# Patient Record
Sex: MALE | Race: OTHER | HISPANIC OR LATINO | Employment: UNEMPLOYED | URBAN - METROPOLITAN AREA
[De-identification: names, ages, dates, MRNs, and addresses within clinical notes are randomized per-mention and may not be internally consistent; named-entity substitution may affect disease eponyms.]

---

## 2021-02-24 ENCOUNTER — HOSPITAL ENCOUNTER (EMERGENCY)
Facility: HOSPITAL | Age: 46
Discharge: HOME | End: 2021-02-24
Attending: EMERGENCY MEDICINE | Admitting: EMERGENCY MEDICINE
Payer: COMMERCIAL

## 2021-02-24 VITALS
SYSTOLIC BLOOD PRESSURE: 140 MMHG | RESPIRATION RATE: 16 BRPM | WEIGHT: 200.4 LBS | HEART RATE: 86 BPM | DIASTOLIC BLOOD PRESSURE: 78 MMHG | OXYGEN SATURATION: 97 % | TEMPERATURE: 98.1 F

## 2021-02-24 DIAGNOSIS — Z20.822 COVID-19 VIRUS RNA TEST RESULT UNKNOWN: Primary | ICD-10-CM

## 2021-02-24 PROCEDURE — 99282 EMERGENCY DEPT VISIT SF MDM: CPT | Performed by: EMERGENCY MEDICINE

## 2021-02-24 PROCEDURE — 87635 SARS-COV-2 COVID-19 AMP PRB: CPT | Performed by: EMERGENCY MEDICINE

## 2021-02-24 PROCEDURE — 99283 EMERGENCY DEPT VISIT LOW MDM: CPT

## 2021-02-25 LAB — SARS-COV-2 RNA RESP QL NAA+PROBE: NEGATIVE

## 2021-02-25 NOTE — ED PROVIDER NOTES
HPI: Patient is a 50 y o  male who presents with 2-3  days of headache and nausea which the patient describes at mild The patient has not had contact with people with similar symptoms  The patient has not taken any medication  Patient is Divehi speaking only    No Known Allergies    History reviewed  No pertinent past medical history  History reviewed  No pertinent surgical history  Social History     Tobacco Use    Smoking status: Not on file   Substance Use Topics    Alcohol use: Not on file    Drug use: Not on file       Nursing notes reviewed  Physical Exam:  ED Triage Vitals [02/24/21 1938]   Temperature Pulse Respirations Blood Pressure SpO2   98 1 °F (36 7 °C) 86 16 140/78 97 %      Temp Source Heart Rate Source Patient Position - Orthostatic VS BP Location FiO2 (%)   Tympanic Monitor Lying Left arm --      Pain Score       --           ROS: Positive for headache, nausea, the remainder of a 10 organ system ROS was otherwise unremarkable  General: awake, alert, no acute distress    Head: normocephalic, atraumatic    Eyes: no scleral icterus  Ears: external ears normal, hearing grossly intact  Nose: external exam grossly normal, negative nasal discharge  Neck: symmetric, No JVD noted, trachea midline  Pulmonary: no respiratory distress, no tachypnea noted  Lungs clear to auscultation bilaterally  Cardiovascular: appears well perfused  Regular rate and rhythm  Abdomen: no distention noted  Bowel sounds x4  Musculoskeletal: no deformities noted, tone normal  Neuro: grossly non-focal   NIH 0  Patient ambulated with a nonataxic gait   Psych: mood and affect appropriate    The patient is stable and has a history and physical exam consistent with a viral illness  COVID19 testing has been performed  I considered the patient's other medical conditions as applicable/noted above in my medical decision making  The patient is stable upon discharge  The plan is for supportive care at home      The patient (and any family present) verbalized understanding of the discharge instructions and warnings that would necessitate return to the Emergency Department  All questions were answered prior to discharge  Medications - No data to display  Final diagnoses:   COVID-19 virus RNA test result unknown     Time reflects when diagnosis was documented in both MDM as applicable and the Disposition within this note     Time User Action Codes Description Comment    2/24/2021  7:36 PM Viet Jaimes [Z20 [id number]] COVID-19 virus RNA test result unknown       ED Disposition     ED Disposition Condition Date/Time Comment    Discharge Stable Wed Feb 24, 2021  7:36 PM Anette Zarate discharge to home/self care  Follow-up Information     Follow up With Specialties Details Why Contact Info Additional [address] [facility] Medicine Schedule an appointment as soon as possible for a visit in 2 days  [address], [address] [facility] [id number]  [address], [address], [address], [address] [place] [address]        Patient's Medications    No medications on file     No discharge procedures on file      Electronically Signed by         Sasha Storey DO  02/24/21 1940

## 2021-02-25 NOTE — DISCHARGE INSTRUCTIONS

## 2021-02-28 ENCOUNTER — TELEPHONE (OUTPATIENT)
Dept: EMERGENCY DEPT | Facility: HOSPITAL | Age: 46
End: 2021-02-28

## 2021-03-02 ENCOUNTER — TELEPHONE (OUTPATIENT)
Dept: EMERGENCY DEPT | Facility: HOSPITAL | Age: 46
End: 2021-03-02

## 2021-03-24 ENCOUNTER — HOSPITAL ENCOUNTER (EMERGENCY)
Age: 46
Discharge: HOME | End: 2021-03-24
Attending: EMERGENCY MEDICINE

## 2021-03-24 ENCOUNTER — APPOINTMENT (OUTPATIENT)
Dept: GENERAL RADIOLOGY | Age: 46
End: 2021-03-24

## 2021-03-24 ENCOUNTER — APPOINTMENT (OUTPATIENT)
Dept: CT IMAGING | Age: 46
End: 2021-03-24

## 2021-03-24 VITALS
WEIGHT: 205 LBS | BODY MASS INDEX: 26.31 KG/M2 | RESPIRATION RATE: 16 BRPM | TEMPERATURE: 98.3 F | SYSTOLIC BLOOD PRESSURE: 115 MMHG | DIASTOLIC BLOOD PRESSURE: 77 MMHG | HEIGHT: 74 IN | HEART RATE: 91 BPM | OXYGEN SATURATION: 98 %

## 2021-03-24 DIAGNOSIS — T07.XXXA MULTIPLE CONTUSIONS: Primary | ICD-10-CM

## 2021-03-24 LAB
ABSOLUTE EOS #: 0.1 K/UL (ref 0–0.4)
ABSOLUTE IMMATURE GRANULOCYTE: ABNORMAL K/UL (ref 0–0.3)
ABSOLUTE LYMPH #: 2.3 K/UL (ref 1–4.8)
ABSOLUTE MONO #: 0.7 K/UL (ref 0.1–1.3)
ALBUMIN SERPL-MCNC: 4.1 G/DL (ref 3.5–5.2)
ALBUMIN/GLOBULIN RATIO: ABNORMAL (ref 1–2.5)
ALP BLD-CCNC: 80 U/L (ref 40–129)
ALT SERPL-CCNC: 13 U/L (ref 5–41)
ANION GAP SERPL CALCULATED.3IONS-SCNC: 11 MMOL/L (ref 9–17)
AST SERPL-CCNC: 17 U/L
BASOPHILS # BLD: 1 % (ref 0–2)
BASOPHILS ABSOLUTE: 0.1 K/UL (ref 0–0.2)
BILIRUB SERPL-MCNC: 0.28 MG/DL (ref 0.3–1.2)
BUN BLDV-MCNC: 18 MG/DL (ref 6–20)
BUN/CREAT BLD: ABNORMAL (ref 9–20)
CALCIUM SERPL-MCNC: 9.3 MG/DL (ref 8.6–10.4)
CHLORIDE BLD-SCNC: 103 MMOL/L (ref 98–107)
CO2: 24 MMOL/L (ref 20–31)
CREAT SERPL-MCNC: 0.79 MG/DL (ref 0.7–1.2)
DIFFERENTIAL TYPE: ABNORMAL
EOSINOPHILS RELATIVE PERCENT: 2 % (ref 0–4)
GFR AFRICAN AMERICAN: >60 ML/MIN
GFR NON-AFRICAN AMERICAN: >60 ML/MIN
GFR SERPL CREATININE-BSD FRML MDRD: ABNORMAL ML/MIN/{1.73_M2}
GFR SERPL CREATININE-BSD FRML MDRD: ABNORMAL ML/MIN/{1.73_M2}
GLUCOSE BLD-MCNC: 102 MG/DL (ref 70–99)
HCT VFR BLD CALC: 42.4 % (ref 41–53)
HEMOGLOBIN: 14.2 G/DL (ref 13.5–17.5)
IMMATURE GRANULOCYTES: ABNORMAL %
LYMPHOCYTES # BLD: 26 % (ref 24–44)
MCH RBC QN AUTO: 29.9 PG (ref 26–34)
MCHC RBC AUTO-ENTMCNC: 33.6 G/DL (ref 31–37)
MCV RBC AUTO: 89 FL (ref 80–100)
MONOCYTES # BLD: 8 % (ref 1–7)
NRBC AUTOMATED: ABNORMAL PER 100 WBC
PDW BLD-RTO: 12.8 % (ref 11.5–14.9)
PLATELET # BLD: 256 K/UL (ref 150–450)
PLATELET ESTIMATE: ABNORMAL
PMV BLD AUTO: 6.9 FL (ref 6–12)
POTASSIUM SERPL-SCNC: 3.9 MMOL/L (ref 3.7–5.3)
RBC # BLD: 4.76 M/UL (ref 4.5–5.9)
RBC # BLD: ABNORMAL 10*6/UL
SEG NEUTROPHILS: 63 % (ref 36–66)
SEGMENTED NEUTROPHILS ABSOLUTE COUNT: 5.4 K/UL (ref 1.3–9.1)
SODIUM BLD-SCNC: 138 MMOL/L (ref 135–144)
TOTAL PROTEIN: 6.8 G/DL (ref 6.4–8.3)
WBC # BLD: 8.5 K/UL (ref 3.5–11)
WBC # BLD: ABNORMAL 10*3/UL

## 2021-03-24 PROCEDURE — 85025 COMPLETE CBC W/AUTO DIFF WBC: CPT

## 2021-03-24 PROCEDURE — 73502 X-RAY EXAM HIP UNI 2-3 VIEWS: CPT

## 2021-03-24 PROCEDURE — 73562 X-RAY EXAM OF KNEE 3: CPT

## 2021-03-24 PROCEDURE — 6370000000 HC RX 637 (ALT 250 FOR IP): Performed by: EMERGENCY MEDICINE

## 2021-03-24 PROCEDURE — 99284 EMERGENCY DEPT VISIT MOD MDM: CPT

## 2021-03-24 PROCEDURE — 72125 CT NECK SPINE W/O DYE: CPT

## 2021-03-24 PROCEDURE — 80053 COMPREHEN METABOLIC PANEL: CPT

## 2021-03-24 PROCEDURE — 72131 CT LUMBAR SPINE W/O DYE: CPT

## 2021-03-24 PROCEDURE — 71260 CT THORAX DX C+: CPT

## 2021-03-24 PROCEDURE — 73080 X-RAY EXAM OF ELBOW: CPT

## 2021-03-24 PROCEDURE — 36415 COLL VENOUS BLD VENIPUNCTURE: CPT

## 2021-03-24 PROCEDURE — 70450 CT HEAD/BRAIN W/O DYE: CPT

## 2021-03-24 PROCEDURE — 70486 CT MAXILLOFACIAL W/O DYE: CPT

## 2021-03-24 RX ORDER — 0.9 % SODIUM CHLORIDE 0.9 %
80 INTRAVENOUS SOLUTION INTRAVENOUS ONCE
Status: DISCONTINUED | OUTPATIENT
Start: 2021-03-24 | End: 2021-03-24 | Stop reason: HOSPADM

## 2021-03-24 RX ORDER — SODIUM CHLORIDE 0.9 % (FLUSH) 0.9 %
10 SYRINGE (ML) INJECTION PRN
Status: DISCONTINUED | OUTPATIENT
Start: 2021-03-24 | End: 2021-03-24 | Stop reason: HOSPADM

## 2021-03-24 RX ORDER — CYCLOBENZAPRINE HCL 10 MG
10 TABLET ORAL 3 TIMES DAILY PRN
Qty: 10 TABLET | Refills: 0 | Status: SHIPPED | OUTPATIENT
Start: 2021-03-24 | End: 2021-04-03

## 2021-03-24 RX ORDER — HYDROCODONE BITARTRATE AND ACETAMINOPHEN 5; 325 MG/1; MG/1
1 TABLET ORAL ONCE
Status: COMPLETED | OUTPATIENT
Start: 2021-03-24 | End: 2021-03-24

## 2021-03-24 RX ORDER — HYDROCODONE BITARTRATE AND ACETAMINOPHEN 5; 325 MG/1; MG/1
1 TABLET ORAL EVERY 6 HOURS PRN
Qty: 10 TABLET | Refills: 0 | Status: SHIPPED | OUTPATIENT
Start: 2021-03-24 | End: 2021-03-27

## 2021-03-24 RX ADMIN — HYDROCODONE BITARTRATE AND ACETAMINOPHEN 1 TABLET: 5; 325 TABLET ORAL at 11:20

## 2021-03-24 SDOH — HEALTH STABILITY: MENTAL HEALTH: HOW OFTEN DO YOU HAVE A DRINK CONTAINING ALCOHOL?: NEVER

## 2021-03-24 ASSESSMENT — ENCOUNTER SYMPTOMS
VOMITING: 0
SHORTNESS OF BREATH: 0
EYE PAIN: 0
WHEEZING: 0
RHINORRHEA: 0
TROUBLE SWALLOWING: 0
BLOOD IN STOOL: 0
NAUSEA: 0
CONSTIPATION: 0
COLOR CHANGE: 0
CHEST TIGHTNESS: 0
BACK PAIN: 1
DIARRHEA: 0
EYE REDNESS: 0
SINUS PRESSURE: 0
EYE DISCHARGE: 0
SORE THROAT: 0
ABDOMINAL PAIN: 0
FACIAL SWELLING: 0
COUGH: 0

## 2021-03-24 ASSESSMENT — PAIN SCALES - GENERAL: PAINLEVEL_OUTOF10: 6
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
## 2021-03-24 NOTE — ED NOTES
C-collar applied by this RN while c-spine maintained. PMS x 4 intact after c-collar applied.      Ana Redmond RN  03/24/21 1037

## 2021-12-01 ENCOUNTER — HOSPITAL ENCOUNTER (EMERGENCY)
Age: 46
Discharge: LEFT BEFORE DISCHARGE | End: 2021-12-01

## 2021-12-01 VITALS
TEMPERATURE: 98.4 F | RESPIRATION RATE: 18 BRPM | SYSTOLIC BLOOD PRESSURE: 136 MMHG | DIASTOLIC BLOOD PRESSURE: 81 MMHG | OXYGEN SATURATION: 99 % | HEART RATE: 109 BPM

## 2021-12-01 ASSESSMENT — PAIN SCALES - GENERAL: PAINLEVEL_OUTOF10: 9

## 2021-12-02 NOTE — ED TRIAGE NOTES
Pt has pain with numbness and tingling to hte left shoulder with decreased ROM no injury noted.   Only speaks Luxembourgish

## 2022-03-04 ENCOUNTER — APPOINTMENT (OUTPATIENT)
Dept: GENERAL RADIOLOGY | Age: 47
End: 2022-03-04
Payer: COMMERCIAL

## 2022-03-04 ENCOUNTER — HOSPITAL ENCOUNTER (EMERGENCY)
Age: 47
Discharge: HOME | End: 2022-03-04
Attending: EMERGENCY MEDICINE
Payer: COMMERCIAL

## 2022-03-04 VITALS
RESPIRATION RATE: 17 BRPM | HEART RATE: 75 BPM | DIASTOLIC BLOOD PRESSURE: 81 MMHG | OXYGEN SATURATION: 96 % | TEMPERATURE: 97.6 F | SYSTOLIC BLOOD PRESSURE: 127 MMHG

## 2022-03-04 DIAGNOSIS — S61.011A LACERATION OF RIGHT THUMB WITHOUT FOREIGN BODY WITHOUT DAMAGE TO NAIL, INITIAL ENCOUNTER: Primary | ICD-10-CM

## 2022-03-04 PROCEDURE — 73130 X-RAY EXAM OF HAND: CPT

## 2022-03-04 PROCEDURE — 99281 EMR DPT VST MAYX REQ PHY/QHP: CPT

## 2022-03-04 PROCEDURE — 12001 RPR S/N/AX/GEN/TRNK 2.5CM/<: CPT

## 2022-03-04 NOTE — Clinical Note
Suman Thacker was seen and treated in our emergency department on 3/4/2022. He is medically stable for discharge to [facility] at this time at 10:10 p.m. 3/4/2022.     Sin Sebastian MD

## 2022-03-04 NOTE — Clinical Note
Pardeep Slaughter was seen and treated in our emergency department on 3/4/2022. He may return to work on 03/05/2022. If you have any questions or concerns, please don't hesitate to call.       Padmini Munguia MD

## 2022-03-04 NOTE — Clinical Note
Emily Mcmillan was seen and treated in our emergency department on 3/4/2022. He is medically stable for discharge to [facility] at this time at 10:10 p.m. 3/4/2022.     Anat Lui MD

## 2022-03-04 NOTE — Clinical Note
Jessica Stoneyjennifer was seen and treated in our emergency department on 3/4/2022. Mr. Nickie Pena was seen and evaluated in our emergency department today. He is medically stable for discharge at this time at 10:10 p.m. 3/4/2022.     Ronald Gibson MD

## 2022-03-05 NOTE — ED PROVIDER NOTES
[address] [facility]     Emergency Department     Faculty Attestation    I performed a history and physical examination of the patient and discussed management with the resident. I reviewed the residents note and agree with the documented findings including all diagnostic interpretations and plan of care. Any areas of disagreement are noted on the chart. I was personally present for the key portions of any procedures. I have documented in the chart those procedures where I was not present during the key portions. I have reviewed the emergency nurses triage note. I agree with the chief complaint, past medical history, past surgical history, allergies, medications, social and family history as documented unless otherwise noted below. Documentation of the HPI, Physical Exam and Medical Decision Making performed by scribjanine is based on my personal performance of the HPI, PE and MDM. For Physician Assistant/ Nurse Practitioner cases/documentation I have personally evaluated this patient and have completed at least one if not all key elements of the E/M (history, physical exam, and MDM). Additional findings are as noted. This patient was evaluated in the Emergency Department for symptoms described in the history of present illness. He/she was evaluated in the context of the global COVID-19 pandemic, which necessitated consideration that the patient might be at risk for infection with the SARS-CoV-2 virus that causes COVID-19. Institutional protocols and algorithms that pertain to the evaluation of patients at risk for COVID-19 are in a state of rapid change based on information released by regulatory bodies including the CDC and federal and state organizations. These policies and algorithms were followed during the patient's care in the ED. Primary Care Physician: No primary care provider on file. History:  This is a 48 y.o. male who presents to the Emergency Department with complaint of finger injury. Patient had laceration to the thumb while working with metal.  The middle was also coated with oil-based material.  Reports significant bleeding at that time. He reports the bleeding has since stopped. Physical:     oral temperature is 97.6 °F (36.4 °C). His blood pressure is 127/81 and his pulse is 75. His respiration is 17 and oxygen saturation is 96%. 48 y.o. male no acute distress, wrap was taken down and patient has a sealed linear laceration on the ulnar surface of the right thumb. No active bleeding at this time. Normal sensation through the thumb.     Impression: Laceration    Plan: X-ray, washout, Dermabond    Dayo Perez MD, Dc Magdaleno  Attending Emergency Physician         Jerry Black MD  03/04/22 2051

## 2022-03-05 NOTE — ED PROVIDER NOTES
[facility] ED  Emergency Department Encounter  EmergencyMedicine Resident     Pt Mirtha Houston  MRN: [id number]  Azragfkeisha 1975  Date of evaluation: 3/4/22  PCP:  No primary care provider on file. This patient was evaluated in the Emergency Department for symptoms described in the history of present illness. The patient was evaluated in the context of the global COVID-19 pandemic, which necessitated consideration that the patient might be at risk for infection with the SARS-CoV-2 virus that causes COVID-19. Institutional protocols and algorithms that pertain to the evaluation of patients at risk for COVID-19 are in a state of rapid change based on information released by regulatory bodies including the CDC and federal and state organizations. These policies and algorithms were followed during the patient's care in the ED. CHIEF COMPLAINT       Finger laceration    HISTORY OF PRESENT ILLNESS  (Location/Symptom, Timing/Onset, Context/Setting, Quality, Duration, Modifying Factors, Severity.)      Simon Bernabe is a 48 y.o. male who presents with finger laceration. 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 Patient has had limited ROM of the right thumb since the incident. He states that the wound was bleeding copiously after the initial injury, but that the wound was wrapped and pressure held, and that it is no longer bleeding. He reports no additional trauma, no head trauma or LOC, no pus or drainage from the wound, no numbness, tingling, or weakness. He is not on any anticoagulation. He does not recall the date of his last tetanus vaccination.     PAST MEDICAL / SURGICAL / SOCIAL / FAMILY HISTORY      has a past medical history of Asthma and Depression. has no past surgical history on file. Social History     Socioeconomic History    Marital status: Single     Spouse name: Not on file    Number of children: Not on file    Years of education: Not on file    Highest education level: Not on file   Occupational History    Not on file   Tobacco Use    Smoking status: Current Every Day Smoker     Packs/day: 2.00     Years: 32.00     Pack years: 64.00     Types: Cigarettes    Smokeless tobacco: Not on file   Substance and Sexual Activity    Alcohol use: Never    Drug use: Never    Sexual activity: Not on file   Other Topics Concern    Not on file   Social History Narrative    Not on file     Social Determinants of Health     Financial Resource Strain:     Difficulty of Paying Living Expenses: Not on file   Food Insecurity:     Worried About Running Out of Food in the Last Year: Not on file    Mikael of Food in the Last Year: Not on file   Transportation Needs:     Lack of Transportation (Medical): Not on file    Lack of Transportation (Non-Medical):  Not on file   Physical Activity:     Days of Exercise per Week: Not on file    Minutes of Exercise per Session: Not on file   Stress:     Feeling of Stress : Not on file   Social Connections:     Frequency of Communication with Friends and Family: Not on file    Frequency of Social Gatherings with Friends and Family: Not on file    Attends Muslim Services: Not on file    Active Member of Clubs or Organizations: Not on file    Attends Club or Organization Meetings: Not on file    Marital Status: Not on file   Intimate Partner Violence:     Fear of Current or Ex-Partner: Not on file    Emotionally Abused: Not on file    Physically Abused: Not on file    Sexually Abused: Not on file   Housing Stability:     Unable to Pay for Housing in the Last Year: Not on file    Number of Places Lived in the Last Year: Not on file    Unstable Housing in the Last Year: Not on file       No family history on file. Allergies:  Patient has no known allergies. Home Medications:  Prior to Admission medications    Medication Sig Start Date End Date Taking? Authorizing Provider   loperamide (RA ANTI-DIARRHEAL) 2 MG capsule Take 1 capsule by mouth 4 times daily as needed for Diarrhea 3/11/22 3/21/22  Thersa Teresa, DO       REVIEW OF SYSTEMS    (2-9 systems for level 4, 10 or more for level 5)      Review of Systems   Constitutional: Negative for activity change, appetite change, chills and fever. HENT: Negative for congestion, rhinorrhea and sore throat. Eyes: Negative for visual disturbance. Respiratory: Negative for cough and shortness of breath. Cardiovascular: Negative for chest pain and palpitations. Gastrointestinal: Negative for abdominal pain, diarrhea, nausea and vomiting. Genitourinary: Negative for dysuria. Musculoskeletal: Negative for arthralgias, back pain, gait problem and myalgias. Skin: Positive for wound (Small laceration to right thumb). Negative for pallor and rash. Neurological: Negative for dizziness, syncope, weakness, light-headedness, numbness and headaches. All other systems reviewed and are negative. PHYSICAL EXAM   (up to 7 for level 4, 8 or more for level 5)      INITIAL VITALS:   /81   Pulse 75   Temp 97.6 °F (36.4 °C) (Oral)   Resp 17   SpO2 96%     Physical Exam  Vitals reviewed. Constitutional:       General: He is not in acute distress. Appearance: He is not toxic-appearing. HENT:      Head: Normocephalic and atraumatic. Nose: Nose normal.      Mouth/Throat:      Mouth: Mucous membranes are moist.      Pharynx: Oropharynx is clear. Eyes:      Extraocular Movements: Extraocular movements intact. Pupils: Pupils are equal, round, and reactive to light.    Cardiovascular:      Rate and Rhythm: Normal rate and regular rhythm. Pulses: Normal pulses. Heart sounds: Normal heart sounds. Pulmonary:      Effort: Pulmonary effort is normal.      Breath sounds: Normal breath sounds. No stridor. No wheezing or rhonchi. Abdominal:      Palpations: Abdomen is soft. Tenderness: There is no abdominal tenderness. There is no guarding or rebound. Musculoskeletal:         General: Signs of injury (0.5 cm superficial laceration to right thumb. No active bleeding. Does not enter the subcutaneous tissue. Sensation and distal pulses intact. Cap refill <2 seconds. ROM limited due to pain. No drainage or discharge.) present. Normal range of motion. Cervical back: Normal range of motion and neck supple. Skin:     Capillary Refill: Capillary refill takes less than 2 seconds. Coloration: Skin is not pale. Findings: No rash. Neurological:      General: No focal deficit present. Mental Status: He is alert and oriented to person, place, and time. Sensory: No sensory deficit. Motor: No weakness. DIFFERENTIAL  DIAGNOSIS     PLAN (LABS / IMAGING / EKG):  Orders Placed This Encounter   Procedures    XR HAND RIGHT (MIN 3 VIEWS)       MEDICATIONS ORDERED:  No orders of the defined types were placed in this encounter. DDX: Evaluate for: time of injury, tetanus within 5 years, location, bite, foreign bodies, exposed tendon, additional injuries/ head trauma      DIAGNOSTIC RESULTS / [address] [place] [address] / [facility]   LAB RESULTS:  No results found for this visit on 03/04/22. IMPRESSION: 49 yo M with laceration to right thumb after an injury with a piece of metal. Vitals stable. Thumb wrapped in tight gauze on presentation, dressing was removed and wound explored. Sealed 0.5 cm laceration on the ulnar surface of the right thumb. No active bleeding. ROM limited due to pain. Bilateral pulses and sensation intact over radial, medial and ulnar distributions. Capillary refill < 2 seconds.  Xray of right hand showing no foreign bodies, fracture or dislocation. Wound cleansed with normal saline and dermabond used for repair. Discussed return precautions and the importance of follow up to the ED or a primary care provider or walk in clinic if symptoms worsen. Patient provided with a work note and a note for the [facility] where he is staying per his request. Patient voiced understanding and all questions were answered. RADIOLOGY:  XR HAND RIGHT (MIN 3 VIEWS)    Result Date: 3/4/2022  EXAMINATION: THREE XRAY VIEWS OF THE RIGHT HAND 3/4/2022 9:00 pm COMPARISON: None. HISTORY: ORDERING SYSTEM PROVIDED HISTORY: finger laceration, r/o foreign bodies TECHNOLOGIST PROVIDED HISTORY: finger laceration, r/o foreign bodies Reason for Exam: lac to thumb from metal FINDINGS: Right hand: Joint spaces are preserved. Alignment is anatomic. No fracture or dislocation. No appreciable soft tissue swelling. No radiopaque foreign body. 1. No acute osseous abnormality or radiopaque foreign body in the right hand. PROCEDURES:  Laceration Repair Procedure Note    Performed by: Chon Michaels MD    Indication: Laceration    Consent: The patient provided verbal consent for this procedure. Time out performed: Immediately prior to the procedure a \"time out\" was called to verify the correct patient, the correct procedure, equipment, support staff and site/side marked as required. Procedure: The patient was placed in the appropriate position and anesthesia around the laceration was not performed at the patient's request. The area was then cleansed using Chloraprep and irrigated with normal saline. The laceration was closed with Dermabond. There were no additional lacerations requiring repair. The laceration was through the Skin but did not enter the Subcutaneous tissue. Total repaired wound length: 0.5 cm. Other Items: None    The patient tolerated the procedure well.     Complications: None        CONSULTS:  None    FINAL IMPRESSION      1. Laceration of right thumb without foreign body without damage to nail, initial encounter          DISPOSITION / PLAN     DISPOSITION  Decision to Discharge 3/4/2022 10:20 PM        PATIENT REFERRED TO:  [address] [facility] [place] [facility]  [address] [facility]  [phone]  Go to   As needed    [facility]  [facility]  [phone]  Go to   If symptoms worsen      DISCHARGE MEDICATIONS:  There are no discharge medications for this patient.       Chery Whitmore MD  Emergency Medicine Resident    (Please note that portions of thisnote were completed with a voice recognition program.  Efforts were made to edit the dictations but occasionally words are mis-transcribed.)       Chery Whitmore MD  Resident  03/12/22 [id number]

## 2022-03-10 ENCOUNTER — HOSPITAL ENCOUNTER (EMERGENCY)
Age: 47
Discharge: HOME | End: 2022-03-11
Attending: EMERGENCY MEDICINE
Payer: COMMERCIAL

## 2022-03-10 VITALS
HEART RATE: 86 BPM | OXYGEN SATURATION: 93 % | TEMPERATURE: 98.5 F | SYSTOLIC BLOOD PRESSURE: 139 MMHG | DIASTOLIC BLOOD PRESSURE: 85 MMHG | RESPIRATION RATE: 18 BRPM

## 2022-03-10 DIAGNOSIS — R19.7 DIARRHEA, UNSPECIFIED TYPE: Primary | ICD-10-CM

## 2022-03-10 LAB
ABSOLUTE EOS #: 0.04 K/UL (ref 0–0.44)
ABSOLUTE IMMATURE GRANULOCYTE: <0.03 K/UL (ref 0–0.3)
ABSOLUTE LYMPH #: 0.91 K/UL (ref 1.1–3.7)
ABSOLUTE MONO #: 0.68 K/UL (ref 0.1–1.2)
ALBUMIN SERPL-MCNC: 4.5 G/DL (ref 3.5–5.2)
ALBUMIN/GLOBULIN RATIO: 1.5 (ref 1–2.5)
ALP BLD-CCNC: 97 U/L (ref 40–129)
ALT SERPL-CCNC: 20 U/L (ref 5–41)
ANION GAP SERPL CALCULATED.3IONS-SCNC: 12 MMOL/L (ref 9–17)
AST SERPL-CCNC: 24 U/L
BASOPHILS # BLD: 0 % (ref 0–2)
BASOPHILS ABSOLUTE: <0.03 K/UL (ref 0–0.2)
BILIRUB SERPL-MCNC: 0.41 MG/DL (ref 0.3–1.2)
BILIRUBIN DIRECT: 0.11 MG/DL
BILIRUBIN, INDIRECT: 0.3 MG/DL (ref 0–1)
BUN BLDV-MCNC: 12 MG/DL (ref 6–20)
CALCIUM SERPL-MCNC: 9.4 MG/DL (ref 8.6–10.4)
CHLORIDE BLD-SCNC: 104 MMOL/L (ref 98–107)
CO2: 23 MMOL/L (ref 20–31)
CREAT SERPL-MCNC: 0.82 MG/DL (ref 0.7–1.2)
DIRECT EXAM: NORMAL
EOSINOPHILS RELATIVE PERCENT: 1 % (ref 1–4)
GFR AFRICAN AMERICAN: >60 ML/MIN
GFR NON-AFRICAN AMERICAN: >60 ML/MIN
GFR SERPL CREATININE-BSD FRML MDRD: NORMAL ML/MIN/{1.73_M2}
GLUCOSE BLD-MCNC: 94 MG/DL (ref 70–99)
HCT VFR BLD CALC: 47.8 % (ref 40.7–50.3)
HEMOGLOBIN: 15.8 G/DL (ref 13–17)
IMMATURE GRANULOCYTES: 0 %
LIPASE: 29 U/L (ref 13–60)
LYMPHOCYTES # BLD: 19 % (ref 24–43)
MCH RBC QN AUTO: 29 PG (ref 25.2–33.5)
MCHC RBC AUTO-ENTMCNC: 33.1 G/DL (ref 28.4–34.8)
MCV RBC AUTO: 87.9 FL (ref 82.6–102.9)
MONOCYTES # BLD: 14 % (ref 3–12)
NRBC AUTOMATED: 0 PER 100 WBC
PDW BLD-RTO: 12.7 % (ref 11.8–14.4)
PLATELET # BLD: 273 K/UL (ref 138–453)
PMV BLD AUTO: 9.2 FL (ref 8.1–13.5)
POTASSIUM SERPL-SCNC: 4.1 MMOL/L (ref 3.7–5.3)
RBC # BLD: 5.44 M/UL (ref 4.21–5.77)
SARS-COV-2, RAPID: NOT DETECTED
SEG NEUTROPHILS: 66 % (ref 36–65)
SEGMENTED NEUTROPHILS ABSOLUTE COUNT: 3.21 K/UL (ref 1.5–8.1)
SODIUM BLD-SCNC: 139 MMOL/L (ref 135–144)
SPECIMEN DESCRIPTION: NORMAL
SPECIMEN DESCRIPTION: NORMAL
TOTAL PROTEIN: 7.5 G/DL (ref 6.4–8.3)
WBC # BLD: 4.9 K/UL (ref 3.5–11.3)

## 2022-03-10 PROCEDURE — 83690 ASSAY OF LIPASE: CPT

## 2022-03-10 PROCEDURE — 6360000002 HC RX W HCPCS: Performed by: STUDENT IN AN ORGANIZED HEALTH CARE EDUCATION/TRAINING PROGRAM

## 2022-03-10 PROCEDURE — 96375 TX/PRO/DX INJ NEW DRUG ADDON: CPT

## 2022-03-10 PROCEDURE — 2500000003 HC RX 250 WO HCPCS: Performed by: STUDENT IN AN ORGANIZED HEALTH CARE EDUCATION/TRAINING PROGRAM

## 2022-03-10 PROCEDURE — 80076 HEPATIC FUNCTION PANEL: CPT

## 2022-03-10 PROCEDURE — 6370000000 HC RX 637 (ALT 250 FOR IP): Performed by: STUDENT IN AN ORGANIZED HEALTH CARE EDUCATION/TRAINING PROGRAM

## 2022-03-10 PROCEDURE — 99283 EMERGENCY DEPT VISIT LOW MDM: CPT

## 2022-03-10 PROCEDURE — 80048 BASIC METABOLIC PNL TOTAL CA: CPT

## 2022-03-10 PROCEDURE — 96372 THER/PROPH/DIAG INJ SC/IM: CPT

## 2022-03-10 PROCEDURE — 87804 INFLUENZA ASSAY W/OPTIC: CPT

## 2022-03-10 PROCEDURE — 85025 COMPLETE CBC W/AUTO DIFF WBC: CPT

## 2022-03-10 PROCEDURE — 96374 THER/PROPH/DIAG INJ IV PUSH: CPT

## 2022-03-10 PROCEDURE — 87635 SARS-COV-2 COVID-19 AMP PRB: CPT

## 2022-03-10 PROCEDURE — 2580000003 HC RX 258: Performed by: STUDENT IN AN ORGANIZED HEALTH CARE EDUCATION/TRAINING PROGRAM

## 2022-03-10 RX ORDER — DICYCLOMINE HYDROCHLORIDE 10 MG/ML
20 INJECTION INTRAMUSCULAR ONCE
Status: COMPLETED | OUTPATIENT
Start: 2022-03-10 | End: 2022-03-10

## 2022-03-10 RX ORDER — BISMUTH SUBSALICYLATE 262 MG/1
524 TABLET, CHEWABLE ORAL ONCE
Status: COMPLETED | OUTPATIENT
Start: 2022-03-10 | End: 2022-03-10

## 2022-03-10 RX ORDER — ONDANSETRON 2 MG/ML
4 INJECTION INTRAMUSCULAR; INTRAVENOUS ONCE
Status: COMPLETED | OUTPATIENT
Start: 2022-03-10 | End: 2022-03-10

## 2022-03-10 RX ORDER — 0.9 % SODIUM CHLORIDE 0.9 %
1000 INTRAVENOUS SOLUTION INTRAVENOUS ONCE
Status: COMPLETED | OUTPATIENT
Start: 2022-03-10 | End: 2022-03-10

## 2022-03-10 RX ORDER — LOPERAMIDE HYDROCHLORIDE 2 MG/1
4 CAPSULE ORAL ONCE
Status: COMPLETED | OUTPATIENT
Start: 2022-03-10 | End: 2022-03-10

## 2022-03-10 RX ADMIN — ONDANSETRON 4 MG: 2 INJECTION INTRAMUSCULAR; INTRAVENOUS at 21:27

## 2022-03-10 RX ADMIN — FAMOTIDINE 20 MG: 10 INJECTION, SOLUTION INTRAVENOUS at 21:27

## 2022-03-10 RX ADMIN — BISMUTH SUBSALICYLATE 524 MG: 262 TABLET, CHEWABLE ORAL at 23:43

## 2022-03-10 RX ADMIN — DICYCLOMINE HYDROCHLORIDE 20 MG: 20 INJECTION INTRAMUSCULAR at 21:27

## 2022-03-10 RX ADMIN — SODIUM CHLORIDE 1000 ML: 9 INJECTION, SOLUTION INTRAVENOUS at 21:28

## 2022-03-10 RX ADMIN — LOPERAMIDE HYDROCHLORIDE 4 MG: 2 CAPSULE ORAL at 23:06

## 2022-03-10 ASSESSMENT — ENCOUNTER SYMPTOMS
SHORTNESS OF BREATH: 0
NAUSEA: 1
ABDOMINAL PAIN: 1
PHOTOPHOBIA: 0
DIARRHEA: 1
BACK PAIN: 0
VOMITING: 0

## 2022-03-10 NOTE — Clinical Note
Herlinda Wilkes was seen and treated in our emergency department on 3/10/2022. Claudene Robin was seen and evaluated today in the emergency department from approximately 9 PM until 12:45 AM.  He tested negative for COVID-19.     Meghann Gary MD

## 2022-03-11 RX ORDER — LOPERAMIDE HYDROCHLORIDE 2 MG/1
2 CAPSULE ORAL 4 TIMES DAILY PRN
Qty: 20 CAPSULE | Refills: 0 | Status: SHIPPED | OUTPATIENT
Start: 2022-03-11 | End: 2022-03-21

## 2022-03-11 NOTE — ED NOTES
Patient up to bathroom. Ambulated without any difficulties  Returned to room.   Will continue to monitor     Leon Olmstead RN  03/10/22 [id number]

## 2022-03-11 NOTE — ED PROVIDER NOTES
[facility] ED  Emergency Department Encounter  EmergencyMedicine Resident     Pt Asaf Lal  MRN: [id number]  [place] 1975  Date of evaluation: 3/10/22  PCP:  No primary care provider on file. This patient was evaluated in the Emergency Department for symptoms described in the history of present illness. The patient was evaluated in the context of the global COVID-19 pandemic, which necessitated consideration that the patient might be at risk for infection with the SARS-CoV-2 virus that causes COVID-19. Institutional protocols and algorithms that pertain to the evaluation of patients at risk for COVID-19 are in a state of rapid change based on information released by regulatory bodies including the CDC and federal and state organizations. These policies and algorithms were followed during the patient's care in the ED. CHIEF COMPLAINT       Chief Complaint   Patient presents with    Diarrhea       HISTORY OF PRESENT ILLNESS  (Location/Symptom, Timing/Onset, Context/Setting, Quality, Duration, Modifying Factors, Severity.)      Herlinda Wilkes is a 48 y.o. male who presents with complaint of 24 hours of nausea diarrhea cramping abdominal pain patient states that he had a soup yesterday and after this he had his symptoms started. Patient currently having a shelter had received 2 doses of Covid vaccine but is not gotten his booster did not get vaccinated against influenza. Patient Tamazight-speaking only was interviewed through the use of an ipad . No fevers subjective chills and general fatigue myalgias. No pharyngitis no headache no trauma no chest pain or shortness of breath. PAST MEDICAL / SURGICAL / SOCIAL / FAMILY HISTORY      has a past medical history of Asthma and Depression. has no past surgical history on file.       Social History     Socioeconomic History    Marital status: Single     Spouse name: Not on file    Number of children: Not on file    Years of education: Not on file    Highest education level: Not on file   Occupational History    Not on file   Tobacco Use    Smoking status: Current Every Day Smoker     Packs/day: 2.00     Years: 32.00     Pack years: 64.00     Types: Cigarettes    Smokeless tobacco: Not on file   Substance and Sexual Activity    Alcohol use: Never    Drug use: Never    Sexual activity: Not on file   Other Topics Concern    Not on file   Social History Narrative    Not on file     Social Determinants of Health     Financial Resource Strain:     Difficulty of Paying Living Expenses: Not on file   Food Insecurity:     Worried About Running Out of Food in the Last Year: Not on file    Mikael of Food in the Last Year: Not on file   Transportation Needs:     Lack of Transportation (Medical): Not on file    Lack of Transportation (Non-Medical): Not on file   Physical Activity:     Days of Exercise per Week: Not on file    Minutes of Exercise per Session: Not on file   Stress:     Feeling of Stress : Not on file   Social Connections:     Frequency of Communication with Friends and Family: Not on file    Frequency of Social Gatherings with Friends and Family: Not on file    Attends [employer] Services: Not on file    Active Member of [address] [place] [address] [employer] or Organizations: Not on file    Attends Club or Organization Meetings: Not on file    Marital Status: Not on file   Intimate Partner Violence:     Fear of Current or Ex-Partner: Not on file    Emotionally Abused: Not on file    Physically Abused: Not on file    Sexually Abused: Not on file   Housing Stability:     Unable to Pay for Housing in the Last Year: Not on file    Number of [place] in the Last Year: Not on file    Unstable Housing in the Last Year: Not on file       No family history on file. Allergies:  Patient has no known allergies. Home Medications:  Prior to Admission medications    Medication Sig Start Date End Date Taking? Authorizing Provider   loperamide (RA ANTI-DIARRHEAL) 2 MG capsule Take 1 capsule by mouth 4 times daily as needed for Diarrhea 3/11/22 3/21/22 Yes Bean Olmedo, DO       REVIEW OF SYSTEMS    (2-9 systems for level 4, 10 or more for level 5)      Review of Systems   Constitutional: Positive for activity change, appetite change, chills and fatigue. Negative for fever. HENT: Negative for congestion. Eyes: Negative for photophobia. Respiratory: Negative for shortness of breath. Cardiovascular: Negative for chest pain. Gastrointestinal: Positive for abdominal pain, diarrhea and nausea. Negative for vomiting. Cramping   Genitourinary: Negative for flank pain. Musculoskeletal: Negative for back pain. Skin: Negative for pallor, rash and wound. Allergic/Immunologic: Negative for environmental allergies and food allergies. Neurological: Negative for headaches. Psychiatric/Behavioral: Negative for agitation. PHYSICAL EXAM   (up to 7 for level 4, 8 or more for level 5)      INITIAL VITALS:   /85   Pulse 86   Temp 98.5 °F (36.9 °C)   Resp 18   SpO2 93%     Physical Exam  Vitals and nursing note reviewed. Constitutional:       Appearance: Normal appearance. He is not toxic-appearing. HENT:      Head: Normocephalic and atraumatic. Right Ear: External ear normal.      Left Ear: External ear normal.      Nose: Nose normal.      Mouth/Throat:      Pharynx: Oropharynx is clear. Eyes:      Conjunctiva/sclera: Conjunctivae normal.   Cardiovascular:      Rate and Rhythm: Normal rate and regular rhythm. Pulses: Normal pulses. Pulmonary:      Effort: Pulmonary effort is normal. No respiratory distress. Abdominal:      Palpations: Abdomen is soft. Tenderness: There is abdominal tenderness. There is no guarding. Musculoskeletal:         General: Normal range of motion. Cervical back: Normal range of motion. Right lower leg: No edema.    Skin:     General: Skin is warm. Capillary Refill: Capillary refill takes less than 2 seconds. Neurological:      Mental Status: He is alert and oriented to person, place, and time. Psychiatric:         Mood and Affect: Mood normal.         DIFFERENTIAL  DIAGNOSIS     PLAN (LABS / IMAGING / EKG):  Orders Placed This Encounter   Procedures    COVID-19, Rapid    Rapid influenza A/B antigens    CBC with Auto Differential    Basic Metabolic Panel    Hepatic Function Panel    Lipase       MEDICATIONS ORDERED:  Orders Placed This Encounter   Medications    ondansetron (ZOFRAN) injection 4 mg    0.9 % sodium chloride bolus    dicyclomine (BENTYL) injection 20 mg    famotidine (PEPCID) injection 20 mg    loperamide (IMODIUM) capsule 4 mg    bismuth subsalicylate (PEPTO BISMOL) chewable tablet 524 mg    loperamide (RA ANTI-DIARRHEAL) 2 MG capsule     Sig: Take 1 capsule by mouth 4 times daily as needed for Diarrhea     Dispense:  20 capsule     Refill:  0       DDX: gastritis, viral illness, pancreatitis, electrolyte abnormality    DIAGNOSTIC RESULTS / EMERGENCY DEPARTMENT COURSE / MDM   LAB RESULTS:  Results for orders placed or performed during the hospital encounter of 03/10/22   COVID-19, Rapid    Specimen: Nasopharyngeal Swab   Result Value Ref Range    Specimen Description . NASOPHARYNGEAL SWAB     SARS-CoV-2, Rapid Not Detected Not Detected   Rapid influenza A/B antigens    Specimen: Nares   Result Value Ref Range    Specimen Description . NARES     Direct Exam       NEGATIVE for Influenza A + B antigens. PCR testing to confirm this result is available upon request.  Specimen will be saved in the laboratory for 7 days. Please call [phone] if PCR testing is indicated.    CBC with Auto Differential   Result Value Ref Range    WBC 4.9 3.5 - 11.3 k/uL    RBC 5.44 4.21 - 5.77 m/uL    Hemoglobin 15.8 13.0 - 17.0 g/dL    Hematocrit 47.8 40.7 - 50.3 %    MCV 87.9 82.6 - 102.9 fL    MCH 29.0 25.2 - 33.5 pg    MCHC 33.1 28.4 - 34.8 g/dL    RDW 12.7 11.8 - 14.4 %    Platelets 150 324 - 407 k/uL    MPV 9.2 8.1 - 13.5 fL    NRBC Automated 0.0 0.0 per 100 WBC    Seg Neutrophils 66 (H) 36 - 65 %    Lymphocytes 19 (L) 24 - 43 %    Monocytes 14 (H) 3 - 12 %    Eosinophils % 1 1 - 4 %    Basophils 0 0 - 2 %    Immature Granulocytes 0 0 %    Segs Absolute 3.21 1.50 - 8.10 k/uL    Absolute Lymph # 0.91 (L) 1.10 - 3.70 k/uL    Absolute Mono # 0.68 0.10 - 1.20 k/uL    Absolute Eos # 0.04 0.00 - 0.44 k/uL    Basophils Absolute <0.03 0.00 - 0.20 k/uL    Absolute Immature Granulocyte <0.03 0.00 - 0.30 k/uL   Basic Metabolic Panel   Result Value Ref Range    Glucose 94 70 - 99 mg/dL    BUN 12 6 - 20 mg/dL    CREATININE 0.82 0.70 - 1.20 mg/dL    Calcium 9.4 8.6 - 10.4 mg/dL    Sodium 139 135 - 144 mmol/L    Potassium 4.1 3.7 - 5.3 mmol/L    Chloride 104 98 - 107 mmol/L    CO2 23 20 - 31 mmol/L    Anion Gap 12 9 - 17 mmol/L    GFR Non-African American >60 >60 mL/min    GFR African American >60 >60 mL/min    GFR Comment         Hepatic Function Panel   Result Value Ref Range    Albumin 4.5 3.5 - 5.2 g/dL    Alkaline Phosphatase 97 40 - 129 U/L    ALT 20 5 - 41 U/L    AST 24 <40 U/L    Total Bilirubin 0.41 0.3 - 1.2 mg/dL    Bilirubin, Direct 0.11 <0.31 mg/dL    Bilirubin, Indirect 0.30 0.00 - 1.00 mg/dL    Total Protein 7.5 6.4 - 8.3 g/dL    Albumin/Globulin Ratio 1.5 1.0 - 2.5   Lipase   Result Value Ref Range    Lipase 29 13 - 60 U/L       IMPRESSION: Is alert oriented nontoxic mildly uncomfortable appearing 75-year-old son speaking only male who is presenting with a approximate 24-hour history of nausea decreased p.o. intake and diarrhea. No blood in his stool no vomiting no chest pain or shortness of breath abdomen is mildly tender without any rebound rigidity guarding or peritoneal signs this is a nonsurgical abdomen is no flank tenderness no CVA tenderness exacerbation has no focal deficits.   Plan will be basic labs IV fluids, supportive care    RADIOLOGY:  No results found. EKG      All EKG's are interpreted by the Emergency Department Physician who either signs or Co-signs this chart in the absence of a cardiologist.    [facility]:  Patient was seen and evaluated interpreted with a iPad service. Provided medications for diarrhea and nausea IV fluids, labs were assessed patient tested negative negative for both influenza as well as COVID-19 was feeling better and tolerating p.o. at time of discharge. PROCEDURES:      CONSULTS:  None    CRITICAL CARE:      FINAL IMPRESSION      1.  Diarrhea, unspecified type          DISPOSITION / PLAN     DISPOSITION  dc      PATIENT REFERRED TO:  [facility]  [address] [address]  [phone]  Go to   If symptoms worsen, As needed    [address] [address] [phone]  Call today  to establish a pcp and for followup and reevaluation in 1-2 days      DISCHARGE MEDICATIONS:  New Prescriptions    LOPERAMIDE (RA ANTI-DIARRHEAL) 2 MG CAPSULE    Take 1 capsule by mouth 4 times daily as needed for Diarrhea       Varsha Garcia DO  Emergency Medicine Resident    (Please note that portions of thisnote were completed with a voice recognition program.  Efforts were made to edit the dictations but occasionally words are mis-transcribed.)        Varsha Garcia DO  Resident  03/11/22 3180

## 2022-03-11 NOTE — ED PROVIDER NOTES
[facility]     Emergency Department     Faculty Attestation    I performed a history and physical examination of the patient and discussed management with the resident. I reviewed the resident´s note and agree with the documented findings and plan of care. Any areas of disagreement are noted on the chart. I was personally present for the key portions of any procedures. I have documented in the chart those procedures where I was not present during the key portions. I have reviewed the emergency nurses triage note. I agree with the chief complaint, past medical history, past surgical history, allergies, medications, social and family history as documented unless otherwise noted below. For Physician Assistant/ Nurse Practitioner cases/documentation I have personally evaluated this patient and have completed at least one if not all key elements of the E/M (history, physical exam, and MDM). Additional findings are as noted. Syriac-speaking only, diarrhea since last evening, no recent travel, abdomen is nontender, no guarding or rebounding, no peritoneal signs, no pain at McBurney's point, no CVA tenderness.      Paulino Dow MD  03/10/22 8995

## 2022-03-11 NOTE — ED PROVIDER NOTES
[address] HANDOFF       Handoff taken on the following patient from prior Attending Physician: Dr. Shiva Ashley  Pt Name: Radha Luz  PCP:  No primary care provider on file. Attestation  I was available and discussed any additional care issues that arose and coordinated the management plans with the resident(s) caring for the patient during my duty period. Any areas of disagreement with resident's documentation of care or procedures are noted on the chart. I was personally present for the key portions of any/all procedures during my duty period. I have documented in the chart those procedures where I was not present during the key portions. CHIEF COMPLAINT       Chief Complaint   Patient presents with    Diarrhea         CURRENT MEDICATIONS     Previous Medications  Previous Medications    No medications on file       Encounter Medications  Orders Placed This Encounter   Medications    ondansetron (ZOFRAN) injection 4 mg    0.9 % sodium chloride bolus    dicyclomine (BENTYL) injection 20 mg    famotidine (PEPCID) injection 20 mg       ALLERGIES     has No Known Allergies. RECENT VITALS:   Temp: 98.5 °F (36.9 °C),  Pulse: 86, Resp: 18, BP: 139/85    RADIOLOGY:   No orders to display       LABS:  Labs Reviewed   CBC WITH AUTO DIFFERENTIAL - Abnormal; Notable for the following components:       Result Value    Seg Neutrophils 66 (*)     Lymphocytes 19 (*)     Monocytes 14 (*)     Absolute Lymph # 0.91 (*)     All other components within normal limits   COVID-19, RAPID   RAPID INFLUENZA A/B ANTIGENS   BASIC METABOLIC PANEL   HEPATIC FUNCTION PANEL   LIPASE           PLAN/ TASKS OUTSTANDING     Likely viral diarrheal illness. Will ensure patient able keep down fluids. likely discharge after labs back.      (Please note that portions of this note were completed with a voice recognition program.  Efforts were made to edit the dictations but occasionally words are mis-transcribed.)    Luke Lundberg MD, MD,   Attending Emergency Physician       Luke Lundberg MD  03/10/22 4153

## 2022-03-11 NOTE — ED NOTES
The following labs labeled with pt sticker and tubed to lab:     [] Blue     [x] Lavender   [] on ice  [x] Green/yellow  [] Green/black [] on ice  [] Yellow  [] Red  [] Pink      [] COVID-19 swab    [x] Rapid  [] PCR  [x] Flu swab  [] Peds Viral Panel     [] Urine Sample  [] Pelvic Cultures  [] Blood Cultures          Irasema Mauricio RN  03/10/22 8041

## 2022-03-12 ASSESSMENT — ENCOUNTER SYMPTOMS
BACK PAIN: 0
COUGH: 0
SHORTNESS OF BREATH: 0
RHINORRHEA: 0
ABDOMINAL PAIN: 0
NAUSEA: 0
DIARRHEA: 0
VOMITING: 0
SORE THROAT: 0

## 2022-03-27 ENCOUNTER — HOSPITAL ENCOUNTER (EMERGENCY)
Age: 47
Discharge: HOME | End: 2022-03-28
Attending: EMERGENCY MEDICINE
Payer: COMMERCIAL

## 2022-03-27 ENCOUNTER — APPOINTMENT (OUTPATIENT)
Dept: GENERAL RADIOLOGY | Age: 47
End: 2022-03-27
Payer: COMMERCIAL

## 2022-03-27 VITALS
SYSTOLIC BLOOD PRESSURE: 120 MMHG | DIASTOLIC BLOOD PRESSURE: 73 MMHG | OXYGEN SATURATION: 96 % | HEART RATE: 108 BPM | RESPIRATION RATE: 18 BRPM | TEMPERATURE: 96.6 F

## 2022-03-27 DIAGNOSIS — S62.92XA HAND FRACTURE, LEFT, CLOSED, INITIAL ENCOUNTER: Primary | ICD-10-CM

## 2022-03-27 PROCEDURE — 99282 EMERGENCY DEPT VISIT SF MDM: CPT

## 2022-03-27 PROCEDURE — 73630 X-RAY EXAM OF FOOT: CPT

## 2022-03-27 PROCEDURE — 29125 APPL SHORT ARM SPLINT STATIC: CPT

## 2022-03-27 PROCEDURE — 73130 X-RAY EXAM OF HAND: CPT

## 2022-03-27 PROCEDURE — 73110 X-RAY EXAM OF WRIST: CPT

## 2022-03-27 ASSESSMENT — ENCOUNTER SYMPTOMS
EYE DISCHARGE: 0
DIARRHEA: 0
CHOKING: 0
WHEEZING: 0
EYE REDNESS: 0
CONSTIPATION: 0
COUGH: 0
SORE THROAT: 0
VOMITING: 0
RHINORRHEA: 0
SHORTNESS OF BREATH: 0

## 2022-03-28 RX ORDER — ACETAMINOPHEN 500 MG
1000 TABLET ORAL 4 TIMES DAILY PRN
Qty: 120 TABLET | Refills: 0 | Status: SHIPPED | OUTPATIENT
Start: 2022-03-28 | End: 2022-03-28 | Stop reason: SDUPTHER

## 2022-03-28 RX ORDER — IBUPROFEN 800 MG/1
800 TABLET ORAL 2 TIMES DAILY PRN
Qty: 180 TABLET | Refills: 0 | Status: SHIPPED | OUTPATIENT
Start: 2022-03-28 | End: 2022-05-25

## 2022-03-28 RX ORDER — ACETAMINOPHEN 500 MG
1000 TABLET ORAL 4 TIMES DAILY PRN
Qty: 120 TABLET | Refills: 0 | Status: ON HOLD | OUTPATIENT
Start: 2022-03-28 | End: 2022-03-31 | Stop reason: HOSPADM
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
## 2022-03-29 ENCOUNTER — OFFICE VISIT (OUTPATIENT)
Dept: BURN CARE | Age: 47
End: 2022-03-29
Payer: COMMERCIAL

## 2022-03-29 ENCOUNTER — TELEPHONE (OUTPATIENT)
Dept: BURN CARE | Age: 47
End: 2022-03-29

## 2022-03-29 VITALS
HEIGHT: 74 IN | SYSTOLIC BLOOD PRESSURE: 128 MMHG | BODY MASS INDEX: 25.67 KG/M2 | HEART RATE: 101 BPM | WEIGHT: 200 LBS | DIASTOLIC BLOOD PRESSURE: 93 MMHG

## 2022-03-29 DIAGNOSIS — S62.339A CLOSED BOXER'S FRACTURE, INITIAL ENCOUNTER: Primary | ICD-10-CM

## 2022-03-29 PROCEDURE — G8427 DOCREV CUR MEDS BY ELIG CLIN: HCPCS | Performed by: PLASTIC SURGERY

## 2022-03-29 PROCEDURE — G8419 CALC BMI OUT NRM PARAM NOF/U: HCPCS | Performed by: PLASTIC SURGERY

## 2022-03-29 PROCEDURE — 4004F PT TOBACCO SCREEN RCVD TLK: CPT | Performed by: PLASTIC SURGERY

## 2022-03-29 PROCEDURE — 99204 OFFICE O/P NEW MOD 45 MIN: CPT | Performed by: PLASTIC SURGERY

## 2022-03-29 PROCEDURE — G8484 FLU IMMUNIZE NO ADMIN: HCPCS | Performed by: PLASTIC SURGERY

## 2022-03-29 NOTE — TELEPHONE ENCOUNTER
Patient is scheduled for surgery on 3/31 at 11:00AM. Talked to patient's brother and gave him the date, time to arrive, NPO after midnight and will need a  to bring him and take him home. Surgery was confirmed and verbalized understanding.
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
## 2022-03-31 ENCOUNTER — ANESTHESIA EVENT (OUTPATIENT)
Dept: OPERATING ROOM | Age: 47
End: 2022-03-31
Payer: COMMERCIAL

## 2022-03-31 ENCOUNTER — HOSPITAL ENCOUNTER (OUTPATIENT)
Age: 47
Setting detail: OUTPATIENT SURGERY
Discharge: HOME | End: 2022-03-31
Attending: PLASTIC SURGERY | Admitting: PLASTIC SURGERY
Payer: COMMERCIAL

## 2022-03-31 ENCOUNTER — APPOINTMENT (OUTPATIENT)
Dept: GENERAL RADIOLOGY | Age: 47
End: 2022-03-31
Attending: PLASTIC SURGERY
Payer: COMMERCIAL

## 2022-03-31 ENCOUNTER — ANESTHESIA (OUTPATIENT)
Dept: OPERATING ROOM | Age: 47
End: 2022-03-31
Payer: COMMERCIAL

## 2022-03-31 VITALS — SYSTOLIC BLOOD PRESSURE: 102 MMHG | OXYGEN SATURATION: 100 % | DIASTOLIC BLOOD PRESSURE: 64 MMHG | TEMPERATURE: 95.9 F

## 2022-03-31 VITALS
HEART RATE: 70 BPM | OXYGEN SATURATION: 100 % | WEIGHT: 200 LBS | TEMPERATURE: 97.9 F | SYSTOLIC BLOOD PRESSURE: 128 MMHG | RESPIRATION RATE: 16 BRPM | DIASTOLIC BLOOD PRESSURE: 71 MMHG | HEIGHT: 74 IN | BODY MASS INDEX: 25.67 KG/M2

## 2022-03-31 DIAGNOSIS — S62.337A DISPLACED FRACTURE OF NECK OF FIFTH METACARPAL BONE, LEFT HAND, INITIAL ENCOUNTER FOR CLOSED FRACTURE: Primary | ICD-10-CM

## 2022-03-31 LAB
SARS-COV-2, RAPID: NOT DETECTED
SPECIMEN DESCRIPTION: NORMAL

## 2022-03-31 PROCEDURE — 7100000001 HC PACU RECOVERY - ADDTL 15 MIN: Performed by: PLASTIC SURGERY

## 2022-03-31 PROCEDURE — C1713 ANCHOR/SCREW BN/BN,TIS/BN: HCPCS | Performed by: PLASTIC SURGERY

## 2022-03-31 PROCEDURE — 6360000002 HC RX W HCPCS: Performed by: NURSE ANESTHETIST, CERTIFIED REGISTERED

## 2022-03-31 PROCEDURE — 2580000003 HC RX 258: Performed by: ANESTHESIOLOGY

## 2022-03-31 PROCEDURE — 87635 SARS-COV-2 COVID-19 AMP PRB: CPT

## 2022-03-31 PROCEDURE — 2709999900 HC NON-CHARGEABLE SUPPLY: Performed by: PLASTIC SURGERY

## 2022-03-31 PROCEDURE — 3209999900 FLUORO FOR SURGICAL PROCEDURES

## 2022-03-31 PROCEDURE — 2580000003 HC RX 258: Performed by: PLASTIC SURGERY

## 2022-03-31 PROCEDURE — 2500000003 HC RX 250 WO HCPCS: Performed by: NURSE ANESTHETIST, CERTIFIED REGISTERED

## 2022-03-31 PROCEDURE — 3700000001 HC ADD 15 MINUTES (ANESTHESIA): Performed by: PLASTIC SURGERY

## 2022-03-31 PROCEDURE — 3600000014 HC SURGERY LEVEL 4 ADDTL 15MIN: Performed by: PLASTIC SURGERY

## 2022-03-31 PROCEDURE — 7100000010 HC PHASE II RECOVERY - FIRST 15 MIN: Performed by: PLASTIC SURGERY

## 2022-03-31 PROCEDURE — 6360000002 HC RX W HCPCS: Performed by: ANESTHESIOLOGY

## 2022-03-31 PROCEDURE — 7100000011 HC PHASE II RECOVERY - ADDTL 15 MIN: Performed by: PLASTIC SURGERY

## 2022-03-31 PROCEDURE — 7100000000 HC PACU RECOVERY - FIRST 15 MIN: Performed by: PLASTIC SURGERY

## 2022-03-31 PROCEDURE — 2500000003 HC RX 250 WO HCPCS: Performed by: PLASTIC SURGERY

## 2022-03-31 PROCEDURE — 3600000004 HC SURGERY LEVEL 4 BASE: Performed by: PLASTIC SURGERY

## 2022-03-31 PROCEDURE — 3700000000 HC ANESTHESIA ATTENDED CARE: Performed by: PLASTIC SURGERY

## 2022-03-31 DEVICE — IMPLANTABLE DEVICE: Type: IMPLANTABLE DEVICE | Site: HAND | Status: FUNCTIONAL

## 2022-03-31 RX ORDER — MIDAZOLAM HYDROCHLORIDE 2 MG/2ML
1 INJECTION, SOLUTION INTRAMUSCULAR; INTRAVENOUS EVERY 10 MIN PRN
Status: DISCONTINUED | OUTPATIENT
Start: 2022-03-31 | End: 2022-03-31 | Stop reason: HOSPADM

## 2022-03-31 RX ORDER — HYDROCODONE BITARTRATE AND ACETAMINOPHEN 5; 325 MG/1; MG/1
1 TABLET ORAL EVERY 6 HOURS PRN
Qty: 20 TABLET | Refills: 0 | Status: SHIPPED | OUTPATIENT
Start: 2022-03-31 | End: 2022-04-05

## 2022-03-31 RX ORDER — SODIUM CHLORIDE 0.9 % (FLUSH) 0.9 %
5-40 SYRINGE (ML) INJECTION PRN
Status: DISCONTINUED | OUTPATIENT
Start: 2022-03-31 | End: 2022-03-31 | Stop reason: HOSPADM

## 2022-03-31 RX ORDER — MAGNESIUM HYDROXIDE 1200 MG/15ML
LIQUID ORAL CONTINUOUS PRN
Status: COMPLETED | OUTPATIENT
Start: 2022-03-31 | End: 2022-03-31

## 2022-03-31 RX ORDER — SODIUM CHLORIDE 9 MG/ML
INJECTION, SOLUTION INTRAVENOUS PRN
Status: DISCONTINUED | OUTPATIENT
Start: 2022-03-31 | End: 2022-03-31 | Stop reason: HOSPADM

## 2022-03-31 RX ORDER — FENTANYL CITRATE 50 UG/ML
25 INJECTION, SOLUTION INTRAMUSCULAR; INTRAVENOUS EVERY 5 MIN PRN
Status: DISCONTINUED | OUTPATIENT
Start: 2022-03-31 | End: 2022-03-31 | Stop reason: HOSPADM

## 2022-03-31 RX ORDER — FENTANYL CITRATE 50 UG/ML
INJECTION, SOLUTION INTRAMUSCULAR; INTRAVENOUS PRN
Status: DISCONTINUED | OUTPATIENT
Start: 2022-03-31 | End: 2022-03-31 | Stop reason: SDUPTHER

## 2022-03-31 RX ORDER — SODIUM CHLORIDE 0.9 % (FLUSH) 0.9 %
5-40 SYRINGE (ML) INJECTION EVERY 12 HOURS SCHEDULED
Status: DISCONTINUED | OUTPATIENT
Start: 2022-03-31 | End: 2022-03-31 | Stop reason: HOSPADM

## 2022-03-31 RX ORDER — CEPHALEXIN 500 MG/1
500 CAPSULE ORAL 3 TIMES DAILY
Qty: 21 CAPSULE | Refills: 0 | Status: SHIPPED | OUTPATIENT
Start: 2022-03-31 | End: 2022-04-07

## 2022-03-31 RX ORDER — DEXAMETHASONE SODIUM PHOSPHATE 10 MG/ML
INJECTION INTRAMUSCULAR; INTRAVENOUS PRN
Status: DISCONTINUED | OUTPATIENT
Start: 2022-03-31 | End: 2022-03-31 | Stop reason: SDUPTHER

## 2022-03-31 RX ORDER — BUPIVACAINE HYDROCHLORIDE AND EPINEPHRINE 5; 5 MG/ML; UG/ML
INJECTION, SOLUTION EPIDURAL; INTRACAUDAL; PERINEURAL PRN
Status: DISCONTINUED | OUTPATIENT
Start: 2022-03-31 | End: 2022-03-31 | Stop reason: ALTCHOICE

## 2022-03-31 RX ORDER — LIDOCAINE HYDROCHLORIDE 10 MG/ML
INJECTION, SOLUTION EPIDURAL; INFILTRATION; INTRACAUDAL; PERINEURAL PRN
Status: DISCONTINUED | OUTPATIENT
Start: 2022-03-31 | End: 2022-03-31 | Stop reason: SDUPTHER

## 2022-03-31 RX ORDER — HYDROCODONE BITARTRATE AND ACETAMINOPHEN 5; 325 MG/1; MG/1
1 TABLET ORAL EVERY 6 HOURS PRN
Qty: 20 TABLET | Refills: 0 | Status: SHIPPED | OUTPATIENT
Start: 2022-03-31 | End: 2022-03-31 | Stop reason: SDUPTHER

## 2022-03-31 RX ORDER — MEPERIDINE HYDROCHLORIDE 50 MG/ML
12.5 INJECTION INTRAMUSCULAR; INTRAVENOUS; SUBCUTANEOUS EVERY 5 MIN PRN
Status: DISCONTINUED | OUTPATIENT
Start: 2022-03-31 | End: 2022-03-31 | Stop reason: HOSPADM

## 2022-03-31 RX ORDER — MIDAZOLAM HYDROCHLORIDE 1 MG/ML
INJECTION INTRAMUSCULAR; INTRAVENOUS PRN
Status: DISCONTINUED | OUTPATIENT
Start: 2022-03-31 | End: 2022-03-31 | Stop reason: SDUPTHER

## 2022-03-31 RX ORDER — SODIUM CHLORIDE 9 MG/ML
25 INJECTION, SOLUTION INTRAVENOUS PRN
Status: DISCONTINUED | OUTPATIENT
Start: 2022-03-31 | End: 2022-03-31 | Stop reason: HOSPADM

## 2022-03-31 RX ORDER — ONDANSETRON 2 MG/ML
4 INJECTION INTRAMUSCULAR; INTRAVENOUS
Status: DISCONTINUED | OUTPATIENT
Start: 2022-03-31 | End: 2022-03-31 | Stop reason: HOSPADM

## 2022-03-31 RX ORDER — LIDOCAINE HYDROCHLORIDE 10 MG/ML
1 INJECTION, SOLUTION EPIDURAL; INFILTRATION; INTRACAUDAL; PERINEURAL
Status: DISCONTINUED | OUTPATIENT
Start: 2022-03-31 | End: 2022-03-31 | Stop reason: HOSPADM

## 2022-03-31 RX ORDER — ONDANSETRON 2 MG/ML
INJECTION INTRAMUSCULAR; INTRAVENOUS PRN
Status: DISCONTINUED | OUTPATIENT
Start: 2022-03-31 | End: 2022-03-31 | Stop reason: SDUPTHER

## 2022-03-31 RX ORDER — SODIUM CHLORIDE, SODIUM LACTATE, POTASSIUM CHLORIDE, CALCIUM CHLORIDE 600; 310; 30; 20 MG/100ML; MG/100ML; MG/100ML; MG/100ML
INJECTION, SOLUTION INTRAVENOUS CONTINUOUS
Status: DISCONTINUED | OUTPATIENT
Start: 2022-03-31 | End: 2022-03-31 | Stop reason: HOSPADM

## 2022-03-31 RX ORDER — PROPOFOL 10 MG/ML
INJECTION, EMULSION INTRAVENOUS PRN
Status: DISCONTINUED | OUTPATIENT
Start: 2022-03-31 | End: 2022-03-31 | Stop reason: SDUPTHER

## 2022-03-31 RX ADMIN — MIDAZOLAM HYDROCHLORIDE 2 MG: 1 INJECTION, SOLUTION INTRAMUSCULAR; INTRAVENOUS at 13:13

## 2022-03-31 RX ADMIN — FENTANYL CITRATE 50 MCG: 50 INJECTION, SOLUTION INTRAMUSCULAR; INTRAVENOUS at 13:16

## 2022-03-31 RX ADMIN — PROPOFOL 200 MG: 10 INJECTION, EMULSION INTRAVENOUS at 13:17

## 2022-03-31 RX ADMIN — Medication 2 G: at 13:25

## 2022-03-31 RX ADMIN — ONDANSETRON 4 MG: 2 INJECTION INTRAMUSCULAR; INTRAVENOUS at 13:47

## 2022-03-31 RX ADMIN — DEXAMETHASONE SODIUM PHOSPHATE 10 MG: 10 INJECTION INTRAMUSCULAR; INTRAVENOUS at 13:34

## 2022-03-31 RX ADMIN — LIDOCAINE HYDROCHLORIDE 50 MG: 10 INJECTION, SOLUTION EPIDURAL; INFILTRATION; INTRACAUDAL at 13:17

## 2022-03-31 RX ADMIN — HYDROMORPHONE HYDROCHLORIDE 0.5 MG: 1 INJECTION, SOLUTION INTRAMUSCULAR; INTRAVENOUS; SUBCUTANEOUS at 14:22

## 2022-03-31 RX ADMIN — SODIUM CHLORIDE, POTASSIUM CHLORIDE, SODIUM LACTATE AND CALCIUM CHLORIDE: 600; 310; 30; 20 INJECTION, SOLUTION INTRAVENOUS at 11:16

## 2022-03-31 ASSESSMENT — PULMONARY FUNCTION TESTS
PIF_VALUE: 3
PIF_VALUE: 4
PIF_VALUE: 6
PIF_VALUE: 3
PIF_VALUE: 2
PIF_VALUE: 6
PIF_VALUE: 1
PIF_VALUE: 6
PIF_VALUE: 0
PIF_VALUE: 3
PIF_VALUE: 4
PIF_VALUE: 17
PIF_VALUE: 0
PIF_VALUE: 6
PIF_VALUE: 7
PIF_VALUE: 3
PIF_VALUE: 3
PIF_VALUE: 11
PIF_VALUE: 2
PIF_VALUE: 3
PIF_VALUE: 2
PIF_VALUE: 11
PIF_VALUE: 3
PIF_VALUE: 14
PIF_VALUE: 3
PIF_VALUE: 13
PIF_VALUE: 3
PIF_VALUE: 3
PIF_VALUE: 1
PIF_VALUE: 21
PIF_VALUE: 7
PIF_VALUE: 3
PIF_VALUE: 15
PIF_VALUE: 15
PIF_VALUE: 16
PIF_VALUE: 3
PIF_VALUE: 21
PIF_VALUE: 2
PIF_VALUE: 2
PIF_VALUE: 3
PIF_VALUE: 0
PIF_VALUE: 11
PIF_VALUE: 8
PIF_VALUE: 3
PIF_VALUE: 0
PIF_VALUE: 15

## 2022-03-31 ASSESSMENT — PAIN DESCRIPTION - ORIENTATION
ORIENTATION: LEFT
ORIENTATION: LEFT

## 2022-03-31 ASSESSMENT — PAIN DESCRIPTION - LOCATION
LOCATION: HAND
LOCATION: HAND

## 2022-03-31 ASSESSMENT — PAIN - FUNCTIONAL ASSESSMENT: PAIN_FUNCTIONAL_ASSESSMENT: 0-10

## 2022-03-31 ASSESSMENT — PAIN SCALES - GENERAL
PAINLEVEL_OUTOF10: 10
PAINLEVEL_OUTOF10: 7

## 2022-03-31 ASSESSMENT — PAIN DESCRIPTION - FREQUENCY: FREQUENCY: CONTINUOUS

## 2022-03-31 ASSESSMENT — PAIN DESCRIPTION - PAIN TYPE
TYPE: SURGICAL PAIN
TYPE: SURGICAL PAIN

## 2022-03-31 NOTE — ANESTHESIA POSTPROCEDURE EVALUATION
Department of Anesthesiology  Postprocedure Note    Patient: Malik Bowen  MRN: [id number]  YOB: 1975  Date of evaluation: 3/31/2022  Time:  2:06 PM     Procedure Summary     Date: 03/31/22 Room / Location: [address]    Anesthesia Start: 8326 Anesthesia Stop:     Procedure: CLOSED REDUCTION PRECUTANEOUS PINNING LEFT 5TH METACARPAL (Left ) Diagnosis: (LEFT SMALL METACARPAL FRACTURE)    Surgeons: Emily Gomez MD Responsible Provider: Carolyn Cerrato MD    Anesthesia Type: general ASA Status: 2          Anesthesia Type: No value filed. Lisa Phase I: Lisa Score: 8    Lisa Phase II:      Last vitals: Reviewed and per EMR flowsheets.        Anesthesia Post Evaluation    Patient location during evaluation: PACU  Patient participation: complete - patient participated  Level of consciousness: awake and alert  Pain score: 3  Airway patency: patent  Nausea & Vomiting: no vomiting and no nausea  Complications: no  Cardiovascular status: hemodynamically stable  Respiratory status: acceptable  Hydration status: stable
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
## 2022-03-31 NOTE — PROGRESS NOTES
Writer spoke with Preston at [facility] at [phone] who confirmed that someone from facility will be picking pt up.

## 2022-03-31 NOTE — ANESTHESIA PRE PROCEDURE
Department of Anesthesiology  Preprocedure Note       Name:  Brit Ross   Age:  48 y.o.  :  1975                                          MRN:  [id number]         Date:  3/31/2022      Surgeon: Gi Shane):  Dea Brooke MD    Procedure: Procedure(s):  CRPP VS. OPEN REDUCTION INTERNAL FIXATION SMALL METACARPAL FRACTURE  (K-WIRES)    Medications prior to admission:   Prior to Admission medications    Medication Sig Start Date End Date Taking? Authorizing Provider   ibuprofen (ADVIL;MOTRIN) 800 MG tablet Take 1 tablet by mouth 2 times daily as needed for Pain  Patient not taking: Reported on 3/29/2022 3/28/22 4/7/22  Madhu Gilmore MD   acetaminophen (TYLENOL) 500 MG tablet Take 2 tablets by mouth 4 times daily as needed for Pain 3/28/22 4/7/22  Madhu Gilmore MD       Current medications:    No current facility-administered medications for this encounter. Allergies:  No Known Allergies    Problem List:  There is no problem list on file for this patient. Past Medical History:        Diagnosis Date    Asthma     Depression        Past Surgical History:  No past surgical history on file. Social History:    Social History     Tobacco Use    Smoking status: Current Every Day Smoker     Packs/day: 2.00     Years: 32.00     Pack years: 64.00     Types: Cigarettes    Smokeless tobacco: Not on file   Substance Use Topics    Alcohol use: Never                                Ready to quit: Not Answered  Counseling given: Not Answered      Vital Signs (Current): There were no vitals filed for this visit.                                            BP Readings from Last 3 Encounters:   22 (!) 128/93   22 120/73   03/10/22 139/85       NPO Status: Time of last liquid consumption: 2200                        Time of last solid consumption: 2100                                                      BMI:   Wt Readings from Last 3 Encounters:   22 200 lb (90.7 kg)   21 ASA 2       Induction: intravenous. Anesthetic plan and risks discussed with patient. Plan discussed with CRNA.                   Stiven Guerra MD   3/31/2022

## 2022-04-01 NOTE — OP NOTE
Operative Note      Patient: Chadwick Castro  YOB: 1975  MRN: [id number]    Date of Procedure: 3/31/2022    Pre-Op Diagnosis: LEFT SMALL METACARPAL NECK FRACTURE    Post-Op Diagnosis: LEFT SMALL METACARPAL NECK FRACTURE       Procedure(s):  CLOSED REDUCTION PRECUTANEOUS PINNING LEFT 5TH METACARPAL    Surgeon(s):  Lakhwinder To MD    Assistant:   Resident: Edmar Francisco DO    Anesthesia: General    Estimated Blood Loss (mL): 1 mL    Fluids: 600 mL crystalloids    Complications: None    Specimens:   * No specimens in log *    Implants:  Implant Name Type Inv. Item Serial No.  Lot No. LRB No. Used Action   WIRE FIX 2 TROCAR PT 1 END 0.045X9 IN NS NIGEL - [id number]  WIRE FIX 2 TROCAR PT 1 END 0.045X9 IN NS NIGEL  [employer]WD  Left 2 Implanted         Drains: * No LDAs found *    Findings: LEFT SMALL METACARPAL NECK FRACTURE    Detailed Description of Procedure:     Patient was brought to the room and placed under sedation by anesthesia. Splint was removed and hand initially assessed with obvious deformity from fracture. Patient was prepped and draped in a sterile manner. A timeout was taken and all were in agreement the operative side. Initial radiographs were taken to determine the degree of displacement. Reduction attempt was made and with correction be confirmed. Once a satisfactory reduction was accomplished initial 0.45 pin was placed and confirmed adequate placement on x-ray. A second pin was placed diagonal pattern to reinforce the reduction and confirmed on radiographs. All x-rays were taken with satisfactory closed reduction reinforced with percutaneous pinning. Pins were then trimmed and covered for safety. Adaptic, 4 x 4's, and Webril was then used. A one-step ulnar gutter splint was then placed in intrinsic plus position and wrapped in Ace wrap. Patient was awoken by anesthesia and delivered to the PACU in excellent condition.      Patient will follow up in 4 weeks time for pin removal instructed to leave splint in place until then.     Electronically signed by Anabella Nino DO on 4/1/2022 at 1:33 PM

## 2022-04-26 ENCOUNTER — OFFICE VISIT (OUTPATIENT)
Dept: BURN CARE | Age: 47
End: 2022-04-26

## 2022-04-26 VITALS
HEIGHT: 74 IN | DIASTOLIC BLOOD PRESSURE: 79 MMHG | BODY MASS INDEX: 25.68 KG/M2 | HEART RATE: 87 BPM | SYSTOLIC BLOOD PRESSURE: 123 MMHG

## 2022-04-26 DIAGNOSIS — S62.339A CLOSED BOXER'S FRACTURE, INITIAL ENCOUNTER: Primary | ICD-10-CM

## 2022-04-26 PROCEDURE — 99024 POSTOP FOLLOW-UP VISIT: CPT | Performed by: PLASTIC SURGERY

## 2022-04-26 RX ORDER — IBUPROFEN 800 MG/1
800 TABLET ORAL EVERY 8 HOURS PRN
Qty: 30 TABLET | Refills: 0 | OUTPATIENT
Start: 2022-04-26 | End: 2022-05-25

## 2022-04-26 NOTE — PROGRESS NOTES
[facility] Office Note  PANCHO Harris MD, FACS      PATIENT NAME: Carol Gomez     Pt comes in today following close reduction percutaneous pinning of a left fifth metacarpal fracture. He is almost 4 weeks out from his pinning. Patient has no complaints at this time. REVIEW OF SYSTEMS:    Past Medical History:   Diagnosis Date    Arthritis     Asthma     Depression      Past Surgical History:   Procedure Laterality Date    FINGER CLOSED REDUCTION Left 03/31/2022    pinning left 5th metacarpal    FINGER FRACTURE SURGERY Left 03/31/2022    CLOSED REDUCTION PRECUTANEOUS PINNING LEFT 5TH METACARPAL    FINGER SURGERY Left 3/31/2022    CLOSED REDUCTION PRECUTANEOUS PINNING LEFT 5TH METACARPAL performed by María Thayer MD at [address] [facility] [address]       No Known Allergies  Current Outpatient Medications   Medication Sig Dispense Refill    ibuprofen (ADVIL;MOTRIN) 800 MG tablet Take 1 tablet by mouth every 8 hours as needed for Pain 30 tablet 0    ibuprofen (ADVIL;MOTRIN) 800 MG tablet Take 1 tablet by mouth 2 times daily as needed for Pain 180 tablet 0     No current facility-administered medications for this visit.      /79   Pulse 87   Ht 6' 2\" (1.88 m)   BMI 25.68 kg/m²   Social History     Socioeconomic History    Marital status: Single     Spouse name: Not on file    Number of children: Not on file    Years of education: Not on file    Highest education level: Not on file   Occupational History    Not on file   Tobacco Use    Smoking status: Current Every Day Smoker     Packs/day: 2.00     Years: 32.00     Pack years: 64.00     Types: Cigarettes    Smokeless tobacco: Not on file   Substance and Sexual Activity    Alcohol use: Never    Drug use: Never    Sexual activity: Not on file   Other Topics Concern    Not on file   Social History Narrative    Not on file     Social Determinants of Health     Financial Resource Strain:     Difficulty of Paying Living Expenses: Not on file   Food Insecurity:     Worried About Running Out of Food in the Last Year: Not on file    Ran Out of Food in the Last Year: Not on file   Transportation Needs:     Lack of Transportation (Medical): Not on file    Lack of Transportation (Non-Medical): Not on file   Physical Activity:     Days of Exercise per Week: Not on file    Minutes of Exercise per Session: Not on file   Stress:     Feeling of Stress : Not on file   Social Connections:     Frequency of Communication with Friends and Family: Not on file    Frequency of Social Gatherings with Friends and Family: Not on file    Attends Holiness Services: Not on file    Active Member of [address] [place] [address] [employer] or Organizations: Not on file    Attends Club or Organization Meetings: Not on file    Marital Status: Not on file   Intimate Partner Violence:     Fear of Current or Ex-Partner: Not on file    Emotionally Abused: Not on file    Physically Abused: Not on file    Sexually Abused: Not on file   Housing Stability:     Unable to Pay for Housing in the Last Year: Not on file    Number of [place] in the Last Year: Not on file    Unstable Housing in the Last Year: Not on file       Review of systems is otherwise negative. On examination the metacarpal is nontender. He appears to have good alignment with no malrotation. His 2 pins were pulled in the office today. Patient tolerated the procedure well. We will get him started in occupational therapy and range of motion exercises. There is no problem list on file for this patient. Plan:  1. Start formal occupational therapy  2. Gentle scar massage  3. Follow up in 3-4 weeks          COVID-19 precautions were taken throughout the entire office visit. Patient was screened for COVID-19 symptoms and temperature was taken prior to coming back to the exam room.    A mask as well as gloves was worn throughout the entire office visit and distancing maintained as much as possible in between the physical examination periods. The patient was seen, evaluated, and treated with my nurse in the room at all times. Portions of this note were transcribed using Dragon voice recognition technology and may reflect some voice recognition variability.

## 2022-04-26 NOTE — LETTER
[address] [facility] [address] [facility] [address]  Phone: [phone]  Fax: [phone]    Prince Quinn MD        April 26, 2022     Patient: Christopher Gambino   YOB: 1975   Date of Visit: 4/26/2022       To Whom It May Concern: It is my medical opinion that Christopher Gambino may not use his L hand for 4 weeks. If you have any questions or concerns, please don't hesitate to call.     Sincerely,        Prince Quinn MD

## 2022-05-25 ENCOUNTER — HOSPITAL ENCOUNTER (EMERGENCY)
Age: 47
Discharge: HOME | End: 2022-05-25
Attending: EMERGENCY MEDICINE
Payer: COMMERCIAL

## 2022-05-25 ENCOUNTER — APPOINTMENT (OUTPATIENT)
Dept: GENERAL RADIOLOGY | Age: 47
End: 2022-05-25
Payer: COMMERCIAL

## 2022-05-25 VITALS
DIASTOLIC BLOOD PRESSURE: 86 MMHG | HEART RATE: 74 BPM | TEMPERATURE: 97.3 F | OXYGEN SATURATION: 98 % | SYSTOLIC BLOOD PRESSURE: 121 MMHG | RESPIRATION RATE: 20 BRPM | HEIGHT: 74 IN | BODY MASS INDEX: 25.68 KG/M2

## 2022-05-25 DIAGNOSIS — S46.912A STRAIN OF LEFT SHOULDER, INITIAL ENCOUNTER: ICD-10-CM

## 2022-05-25 DIAGNOSIS — S62.307A CLOSED NONDISPLACED FRACTURE OF FIFTH METACARPAL BONE OF LEFT HAND, UNSPECIFIED PORTION OF METACARPAL, INITIAL ENCOUNTER: ICD-10-CM

## 2022-05-25 DIAGNOSIS — S70.02XA CONTUSION OF LEFT HIP, INITIAL ENCOUNTER: ICD-10-CM

## 2022-05-25 DIAGNOSIS — S20.212A CONTUSION OF LEFT CHEST WALL, INITIAL ENCOUNTER: ICD-10-CM

## 2022-05-25 DIAGNOSIS — S40.012A CONTUSION OF MULTIPLE SITES OF LEFT SHOULDER AND UPPER ARM, INITIAL ENCOUNTER: ICD-10-CM

## 2022-05-25 DIAGNOSIS — S40.022A CONTUSION OF MULTIPLE SITES OF LEFT SHOULDER AND UPPER ARM, INITIAL ENCOUNTER: ICD-10-CM

## 2022-05-25 DIAGNOSIS — S22.42XA CLOSED FRACTURE OF MULTIPLE RIBS OF LEFT SIDE, INITIAL ENCOUNTER: ICD-10-CM

## 2022-05-25 DIAGNOSIS — S80.02XA CONTUSION OF LEFT KNEE, INITIAL ENCOUNTER: ICD-10-CM

## 2022-05-25 DIAGNOSIS — S60.512A ABRASION OF LEFT HAND, INITIAL ENCOUNTER: ICD-10-CM
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
## 2022-05-25 DIAGNOSIS — S60.511A HAND ABRASION, RIGHT, INITIAL ENCOUNTER: ICD-10-CM

## 2022-05-25 PROCEDURE — 73130 X-RAY EXAM OF HAND: CPT

## 2022-05-25 PROCEDURE — 71101 X-RAY EXAM UNILAT RIBS/CHEST: CPT

## 2022-05-25 PROCEDURE — 73502 X-RAY EXAM HIP UNI 2-3 VIEWS: CPT

## 2022-05-25 PROCEDURE — 73030 X-RAY EXAM OF SHOULDER: CPT

## 2022-05-25 PROCEDURE — 73080 X-RAY EXAM OF ELBOW: CPT

## 2022-05-25 PROCEDURE — 6370000000 HC RX 637 (ALT 250 FOR IP): Performed by: EMERGENCY MEDICINE

## 2022-05-25 PROCEDURE — 6360000002 HC RX W HCPCS: Performed by: EMERGENCY MEDICINE

## 2022-05-25 PROCEDURE — 73562 X-RAY EXAM OF KNEE 3: CPT

## 2022-05-25 PROCEDURE — 90715 TDAP VACCINE 7 YRS/> IM: CPT | Performed by: EMERGENCY MEDICINE

## 2022-05-25 PROCEDURE — 90471 IMMUNIZATION ADMIN: CPT | Performed by: EMERGENCY MEDICINE

## 2022-05-25 PROCEDURE — 99284 EMERGENCY DEPT VISIT MOD MDM: CPT

## 2022-05-25 RX ORDER — HYDROCODONE BITARTRATE AND ACETAMINOPHEN 5; 325 MG/1; MG/1
1 TABLET ORAL EVERY 6 HOURS PRN
Qty: 12 TABLET | Refills: 0 | Status: SHIPPED | OUTPATIENT
Start: 2022-05-25 | End: 2022-05-28

## 2022-05-25 RX ORDER — IBUPROFEN 800 MG/1
800 TABLET ORAL ONCE
Status: COMPLETED | OUTPATIENT
Start: 2022-05-25 | End: 2022-05-25

## 2022-05-25 RX ORDER — ACETAMINOPHEN 325 MG/1
650 TABLET ORAL ONCE
Status: COMPLETED | OUTPATIENT
Start: 2022-05-25 | End: 2022-05-25

## 2022-05-25 RX ORDER — NAPROXEN 500 MG/1
500 TABLET ORAL 2 TIMES DAILY PRN
Qty: 30 TABLET | Refills: 0 | Status: SHIPPED | OUTPATIENT
Start: 2022-05-25 | End: 2022-06-01

## 2022-05-25 RX ADMIN — IBUPROFEN 800 MG: 800 TABLET, FILM COATED ORAL at 08:26

## 2022-05-25 RX ADMIN — ACETAMINOPHEN 650 MG: 325 TABLET ORAL at 08:28

## 2022-05-25 RX ADMIN — TETANUS TOXOID, REDUCED DIPHTHERIA TOXOID AND ACELLULAR PERTUSSIS VACCINE, ADSORBED 0.5 ML: 5; 2.5; 8; 8; 2.5 SUSPENSION INTRAMUSCULAR at 08:29

## 2022-05-25 ASSESSMENT — PAIN DESCRIPTION - ORIENTATION: ORIENTATION: RIGHT

## 2022-05-25 ASSESSMENT — ENCOUNTER SYMPTOMS: BACK PAIN: 0

## 2022-05-25 ASSESSMENT — PAIN DESCRIPTION - LOCATION: LOCATION: HAND;WRIST

## 2022-05-25 ASSESSMENT — PAIN SCALES - GENERAL: PAINLEVEL_OUTOF10: 10

## 2022-05-25 NOTE — Clinical Note
Chelsea Saunders was seen and treated in our emergency department on 5/25/2022. He may return to work on 06/01/2022. With limited use of L arm until follows up with specialist      If you have any questions or concerns, please don't hesitate to call.       Ameya Zuñiga, DO

## 2022-05-25 NOTE — ED NOTES
SW consult to make sure patient has a follow-up appointment for his hand fracture. Patient is Uzbek-speaking only so a translater was used. SW confirmed that patient has an appointment with Dr. Sanjeev Medeiros, at the [address] [place] [address], on Tuesday May 31 @9:30am.  This was given to patient in writing with directions. Patient lives in a shelter and is requesting a note so that he does not have to leave the shelter during the day, RN updated. Leilani Covarrubias.  [facility]  05/25/22 1054
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
## 2022-05-25 NOTE — Clinical Note
Per Avila was seen and treated in our emergency department on 5/25/2022.     Patient is medically stable to return back to the [facility] 1721, DO

## 2022-06-01 ENCOUNTER — APPOINTMENT (OUTPATIENT)
Dept: CT IMAGING | Age: 47
End: 2022-06-01
Payer: COMMERCIAL

## 2022-06-01 ENCOUNTER — HOSPITAL ENCOUNTER (EMERGENCY)
Age: 47
Discharge: HOME | End: 2022-06-01
Attending: EMERGENCY MEDICINE
Payer: COMMERCIAL

## 2022-06-01 ENCOUNTER — APPOINTMENT (OUTPATIENT)
Dept: GENERAL RADIOLOGY | Age: 47
End: 2022-06-01
Payer: COMMERCIAL

## 2022-06-01 VITALS
DIASTOLIC BLOOD PRESSURE: 96 MMHG | RESPIRATION RATE: 19 BRPM | TEMPERATURE: 98.2 F | OXYGEN SATURATION: 94 % | BODY MASS INDEX: 25.67 KG/M2 | HEART RATE: 91 BPM | WEIGHT: 200 LBS | HEIGHT: 74 IN | SYSTOLIC BLOOD PRESSURE: 120 MMHG

## 2022-06-01 DIAGNOSIS — R07.89 ATYPICAL CHEST PAIN: Primary | ICD-10-CM

## 2022-06-01 LAB
ABSOLUTE EOS #: 0.41 K/UL (ref 0–0.44)
ABSOLUTE IMMATURE GRANULOCYTE: 0.05 K/UL (ref 0–0.3)
ABSOLUTE LYMPH #: 2.21 K/UL (ref 1.1–3.7)
ABSOLUTE MONO #: 0.83 K/UL (ref 0.1–1.2)
ANION GAP SERPL CALCULATED.3IONS-SCNC: 15 MMOL/L (ref 9–17)
BASOPHILS # BLD: 1 % (ref 0–2)
BASOPHILS ABSOLUTE: 0.06 K/UL (ref 0–0.2)
BUN BLDV-MCNC: 29 MG/DL (ref 6–20)
CALCIUM SERPL-MCNC: 10 MG/DL (ref 8.6–10.4)
CHLORIDE BLD-SCNC: 105 MMOL/L (ref 98–107)
CO2: 20 MMOL/L (ref 20–31)
CREAT SERPL-MCNC: 0.78 MG/DL (ref 0.7–1.2)
D-DIMER QUANTITATIVE: 0.83 MG/L FEU
EOSINOPHILS RELATIVE PERCENT: 4 % (ref 1–4)
GFR AFRICAN AMERICAN: >60 ML/MIN
GFR NON-AFRICAN AMERICAN: >60 ML/MIN
GFR SERPL CREATININE-BSD FRML MDRD: ABNORMAL ML/MIN/{1.73_M2}
GLUCOSE BLD-MCNC: 152 MG/DL (ref 70–99)
HCT VFR BLD CALC: 41.9 % (ref 40.7–50.3)
HEMOGLOBIN: 13.9 G/DL (ref 13–17)
IMMATURE GRANULOCYTES: 1 %
LYMPHOCYTES # BLD: 21 % (ref 24–43)
MCH RBC QN AUTO: 29.3 PG (ref 25.2–33.5)
MCHC RBC AUTO-ENTMCNC: 33.2 G/DL (ref 28.4–34.8)
MCV RBC AUTO: 88.4 FL (ref 82.6–102.9)
MONOCYTES # BLD: 8 % (ref 3–12)
NRBC AUTOMATED: 0 PER 100 WBC
PDW BLD-RTO: 13.3 % (ref 11.8–14.4)
PLATELET # BLD: 314 K/UL (ref 138–453)
PMV BLD AUTO: 9.2 FL (ref 8.1–13.5)
POTASSIUM SERPL-SCNC: 4.1 MMOL/L (ref 3.7–5.3)
RBC # BLD: 4.74 M/UL (ref 4.21–5.77)
SEG NEUTROPHILS: 65 % (ref 36–65)
SEGMENTED NEUTROPHILS ABSOLUTE COUNT: 7.1 K/UL (ref 1.5–8.1)
SODIUM BLD-SCNC: 140 MMOL/L (ref 135–144)
TROPONIN, HIGH SENSITIVITY: <6 NG/L (ref 0–22)
TROPONIN, HIGH SENSITIVITY: <6 NG/L (ref 0–22)
WBC # BLD: 10.7 K/UL (ref 3.5–11.3)

## 2022-06-01 PROCEDURE — 85025 COMPLETE CBC W/AUTO DIFF WBC: CPT

## 2022-06-01 PROCEDURE — 85379 FIBRIN DEGRADATION QUANT: CPT

## 2022-06-01 PROCEDURE — 6370000000 HC RX 637 (ALT 250 FOR IP): Performed by: STUDENT IN AN ORGANIZED HEALTH CARE EDUCATION/TRAINING PROGRAM

## 2022-06-01 PROCEDURE — 93005 ELECTROCARDIOGRAM TRACING: CPT | Performed by: STUDENT IN AN ORGANIZED HEALTH CARE EDUCATION/TRAINING PROGRAM

## 2022-06-01 PROCEDURE — 84484 ASSAY OF TROPONIN QUANT: CPT

## 2022-06-01 PROCEDURE — 80048 BASIC METABOLIC PNL TOTAL CA: CPT

## 2022-06-01 PROCEDURE — 99285 EMERGENCY DEPT VISIT HI MDM: CPT

## 2022-06-01 PROCEDURE — 71260 CT THORAX DX C+: CPT

## 2022-06-01 PROCEDURE — 71045 X-RAY EXAM CHEST 1 VIEW: CPT

## 2022-06-01 PROCEDURE — 6360000004 HC RX CONTRAST MEDICATION: Performed by: STUDENT IN AN ORGANIZED HEALTH CARE EDUCATION/TRAINING PROGRAM

## 2022-06-01 RX ORDER — IBUPROFEN 800 MG/1
800 TABLET ORAL ONCE
Status: COMPLETED | OUTPATIENT
Start: 2022-06-01 | End: 2022-06-01

## 2022-06-01 RX ORDER — IBUPROFEN 600 MG/1
600 TABLET ORAL EVERY 8 HOURS PRN
Qty: 20 TABLET | Refills: 0 | Status: SHIPPED | OUTPATIENT
Start: 2022-06-01 | End: 2022-06-08 | Stop reason: SDUPTHER

## 2022-06-01 RX ADMIN — IOPAMIDOL 75 ML: 755 INJECTION, SOLUTION INTRAVENOUS at 05:57

## 2022-06-01 RX ADMIN — IBUPROFEN 800 MG: 800 TABLET, FILM COATED ORAL at 04:36

## 2022-06-01 ASSESSMENT — PAIN DESCRIPTION - PAIN TYPE: TYPE: ACUTE PAIN

## 2022-06-01 ASSESSMENT — PAIN DESCRIPTION - LOCATION: LOCATION: CHEST

## 2022-06-01 ASSESSMENT — ENCOUNTER SYMPTOMS
DIARRHEA: 0
PHOTOPHOBIA: 0
CONSTIPATION: 0
RHINORRHEA: 0
NAUSEA: 0
CHEST TIGHTNESS: 0
SINUS PRESSURE: 0
ABDOMINAL PAIN: 0
SHORTNESS OF BREATH: 1
COUGH: 0
EYE REDNESS: 0
SORE THROAT: 0
COLOR CHANGE: 0
VOMITING: 0
EYE PAIN: 0

## 2022-06-01 ASSESSMENT — PAIN DESCRIPTION - ORIENTATION: ORIENTATION: RIGHT;LEFT

## 2022-06-01 ASSESSMENT — PAIN SCALES - GENERAL: PAINLEVEL_OUTOF10: 9

## 2022-06-01 ASSESSMENT — PAIN - FUNCTIONAL ASSESSMENT: PAIN_FUNCTIONAL_ASSESSMENT: 0-10

## 2022-06-01 NOTE — ED PROVIDER NOTES
[facility]     Emergency Department     Faculty Attestation    I performed a history and physical examination of the patient and discussed management with the resident. I have reviewed and agree with the residents findings including all diagnostic interpretations, and treatment plans as written. Any areas of disagreement are noted on the chart. I was personally present for the key portions of any procedures. I have documented in the chart those procedures where I was not present during the key portions. I have reviewed the emergency nurses triage note. I agree with the chief complaint, past medical history, past surgical history, allergies, medications, social and family history as documented unless otherwise noted below. Documentation of the HPI, Physical Exam and Medical Decision Making performed by [email or web address] is based on my personal performance of the HPI, PE and MDM. For Physician Assistant/ Nurse Practitioner cases/documentation I have personally evaluated this patient and have completed at least one if not all key elements of the E/M (history, physical exam, and MDM). Additional findings are as noted. 47 yo M c/o \"rib pain\", no injury, no diaphoresis no nausea,   Hx pe facilitated by Yoruba speaking interpretor,   pe no cervical tenderness, crepitus or deformity, chest non tender, no bruising, no crepitus, abdomen protuberant, non tender, no distension, no rigidity, no calf swelling, no calf tenderness,     Ct no pe, mild ggo, vss    EKG Interpretation    Interpreted by me  Normal sinus rhythm, heart rate 81, no ischemia, normal axis, QT corrected 411    CRITICAL CARE: There was a high probability of clinically significant/life threatening deterioration in this patient's condition which required my urgent intervention. Total critical care time was 5 minutes. This excludes any time for separately reportable procedures.        [facility] DO Arash Garnett, DO  06/01/22 [address], DO  06/01/22 [address], DO  06/01/22 8212

## 2022-06-01 NOTE — ED PROVIDER NOTES
[facility] ED  Emergency Department Encounter  EmergencyMedicine Resident     Pt Michelle Goetz  MRN: [id number]  [place] 1975  Date of evaluation: 6/1/22  PCP:  No primary care provider on file. This patient was evaluated in the Emergency Department for symptoms described in the history of present illness. The patient was evaluated in the context of the global COVID-19 pandemic, which necessitated consideration that the patient might be at risk for infection with the SARS-CoV-2 virus that causes COVID-19. Institutional protocols and algorithms that pertain to the evaluation of patients at risk for COVID-19 are in a state of rapid change based on information released by regulatory bodies including the CDC and federal and state organizations. These policies and algorithms were followed during the patient's care in the ED. CHIEF COMPLAINT       Chief Complaint   Patient presents with    Chest Pain       HISTORY OF PRESENT ILLNESS  (Location/Symptom, Timing/Onset, Context/Setting, Quality, Duration, Modifying Factors, Severity.)      Cony Land is a 48 y.o. male who presents with acute onset of right-sided chest pain radiating to midsternal described as sharp, began at 6 PM tonight associated with shortness of breath. Pain is non reproducible, no position makes it better or worse. Patient is Syriac-speaking only requires an . States he was at home when this occurred, went outside to get some fresh air thinking it would resolve the symptoms and the did not. He has no medical history other than what sounds like atherosclerotic plaques to left artery. Denies a heart history, no diabetes. No history of blood clots. No AC use. PAST MEDICAL / SURGICAL / SOCIAL / FAMILY HISTORY      has a past medical history of Arthritis, Asthma, and Depression. has a past surgical history that includes meniscectomy;  Finger fracture surgery (Left, 03/31/2022); Finger Closed Reduction (Left, 03/31/2022); and Finger surgery (Left, 3/31/2022). Social History     Socioeconomic History    Marital status: Single     Spouse name: Not on file    Number of children: Not on file    Years of education: Not on file    Highest education level: Not on file   Occupational History    Not on file   Tobacco Use    Smoking status: Current Every Day Smoker     Packs/day: 2.00     Years: 32.00     Pack years: 64.00     Types: Cigarettes    Smokeless tobacco: Not on file   Substance and Sexual Activity    Alcohol use: Never    Drug use: Never    Sexual activity: Not on file   Other Topics Concern    Not on file   Social History Narrative    Not on file     Social Determinants of Health     Financial Resource Strain:     Difficulty of Paying Living Expenses: Not on file   Food Insecurity:     Worried About Running Out of Food in the Last Year: Not on file    Mikael of Food in the Last Year: Not on file   Transportation Needs:     Lack of Transportation (Medical): Not on file    Lack of Transportation (Non-Medical):  Not on file   Physical Activity:     Days of Exercise per Week: Not on file    Minutes of Exercise per Session: Not on file   Stress:     Feeling of Stress : Not on file   Social Connections:     Frequency of Communication with Friends and Family: Not on file    Frequency of Social Gatherings with Friends and Family: Not on file    Attends Buddhism Services: Not on file    Active Member of Clubs or Organizations: Not on file    Attends Club or Organization Meetings: Not on file    Marital Status: Not on file   Intimate Partner Violence:     Fear of Current or Ex-Partner: Not on file    Emotionally Abused: Not on file    Physically Abused: Not on file    Sexually Abused: Not on file   Housing Stability:     Unable to Pay for Housing in the Last Year: Not on file    Number of Places Lived in the Last Year: Not on file    Unstable Housing in the Last Year: Not on file       History reviewed. No pertinent family history. Allergies:  Food    Home Medications:  Prior to Admission medications    Medication Sig Start Date End Date Taking? Authorizing Provider   ibuprofen (ADVIL;MOTRIN) 600 MG tablet Take 1 tablet by mouth every 8 hours as needed for Pain 6/1/22  Yes Cale Blake MD       REVIEW OF SYSTEMS    (2-9 systems for level 4, 10 or more for level 5)      Review of Systems   Constitutional: Negative for activity change, chills, diaphoresis, fatigue and fever. HENT: Negative for congestion, rhinorrhea, sinus pressure and sore throat. Eyes: Negative for photophobia, pain and redness. Respiratory: Positive for shortness of breath. Negative for cough and chest tightness. Cardiovascular: Positive for chest pain. Negative for leg swelling. Gastrointestinal: Negative for abdominal pain, constipation, diarrhea, nausea and vomiting. Genitourinary: Negative for dysuria, flank pain, frequency and urgency. Musculoskeletal: Negative for arthralgias, myalgias and neck stiffness. Skin: Negative for color change, pallor and rash. Neurological: Negative for dizziness, syncope, weakness, light-headedness, numbness and headaches. PHYSICAL EXAM   (up to 7 for level 4, 8 or more for level 5)      INITIAL VITALS:   BP (!) 120/96   Pulse 91   Temp 98.2 °F (36.8 °C) (Oral)   Resp 19   Ht 6' 2\" (1.88 m)   Wt 200 lb (90.7 kg)   SpO2 94%   BMI 25.68 kg/m²     Physical Exam  Constitutional:  Well developed, Well nourished. In no apparent distress  HENT:  Normocephalic, Atraumatic, Bilateral external ears normal,  Nose normal.   Neck: Normal range of motion, No stridor. Eyes:   No discharge. Respiratory:   No respiratory distress, Normal breath sounds without any wheezing, rales or rhonchi. Cardiovascular: Normal S1, S2. No rubs, gallops or murmurs.   Gastrointestinal:  No organomegaly, no tenderness, no rebound or guarding. Musculoskeletal:  No extremity deformity. Right sided chest pain is non reproducible. Lymphatic: No lymphadenopathy noted  Skin:  Warm, Dry,  No rash. Neurologic:  Alert & oriented x 3, Normal motor function,  No focal deficits noted.    Psychiatric:  Affect normal, Judgment normal, Mood normal.              DIFFERENTIAL  DIAGNOSIS     PLAN (LABS / IMAGING / EKG):  Orders Placed This Encounter   Procedures    XR CHEST PORTABLE    CT CHEST PULMONARY EMBOLISM W CONTRAST    CBC with Auto Differential    Basic Metabolic Panel w/ Reflex to MG    Troponin    D-Dimer, Quantitative    EKG 12 Lead       MEDICATIONS ORDERED:  Orders Placed This Encounter   Medications    ibuprofen (ADVIL;MOTRIN) tablet 800 mg    iopamidol (ISOVUE-370) 76 % injection 75 mL    ibuprofen (ADVIL;MOTRIN) 600 MG tablet     Sig: Take 1 tablet by mouth every 8 hours as needed for Pain     Dispense:  20 tablet     Refill:  0       DDX: NSTEMI, unstable angina, PE, costochondritis, musculoskeletal    DIAGNOSTIC RESULTS / EMERGENCY DEPARTMENT COURSE / MDM   LAB RESULTS:  Results for orders placed or performed during the hospital encounter of 06/01/22   CBC with Auto Differential   Result Value Ref Range    WBC 10.7 3.5 - 11.3 k/uL    RBC 4.74 4.21 - 5.77 m/uL    Hemoglobin 13.9 13.0 - 17.0 g/dL    Hematocrit 41.9 40.7 - 50.3 %    MCV 88.4 82.6 - 102.9 fL    MCH 29.3 25.2 - 33.5 pg    MCHC 33.2 28.4 - 34.8 g/dL    RDW 13.3 11.8 - 14.4 %    Platelets 094 118 - 255 k/uL    MPV 9.2 8.1 - 13.5 fL    NRBC Automated 0.0 0.0 per 100 WBC    Seg Neutrophils 65 36 - 65 %    Lymphocytes 21 (L) 24 - 43 %    Monocytes 8 3 - 12 %    Eosinophils % 4 1 - 4 %    Basophils 1 0 - 2 %    Immature Granulocytes 1 (H) 0 %    Segs Absolute 7.10 1.50 - 8.10 k/uL    Absolute Lymph # 2.21 1.10 - 3.70 k/uL    Absolute Mono # 0.83 0.10 - 1.20 k/uL    Absolute Eos # 0.41 0.00 - 0.44 k/uL    Basophils Absolute 0.06 0.00 - 0.20 k/uL    Absolute Immature Granulocyte 0.05 0.00 - 0.30 k/uL   Basic Metabolic Panel w/ Reflex to MG   Result Value Ref Range    Glucose 152 (H) 70 - 99 mg/dL    BUN 29 (H) 6 - 20 mg/dL    CREATININE 0.78 0.70 - 1.20 mg/dL    Calcium 10.0 8.6 - 10.4 mg/dL    Sodium 140 135 - 144 mmol/L    Potassium 4.1 3.7 - 5.3 mmol/L    Chloride 105 98 - 107 mmol/L    CO2 20 20 - 31 mmol/L    Anion Gap 15 9 - 17 mmol/L    GFR Non-African American >60 >60 mL/min    GFR African American >60 >60 mL/min    GFR Comment         Troponin   Result Value Ref Range    Troponin, High Sensitivity <6 0 - 22 ng/L   Troponin   Result Value Ref Range    Troponin, High Sensitivity <6 0 - 22 ng/L   D-Dimer, Quantitative   Result Value Ref Range    D-Dimer, Quant 0.83 mg/L FEU         RADIOLOGY:  CT CHEST PULMONARY EMBOLISM W CONTRAST   Final Result   1. No pulmonary emboli. 2. Mild dependent ground-glass change favored to represent atelectasis,   superimposed on mild background emphysematous change. XR CHEST PORTABLE   Final Result   Clear chest without acute cardiopulmonary process. EKG  EKG Interpretation    Interpreted by emergency department physician    Rhythm: normal sinus   Rate: normal  Axis: normal  Ectopy: none  Conduction: normal  ST Segments: no acute change  T Waves: no acute change  Q Waves: none    Clinical Impression: no acute changes    Braden Jimenez MD     All EKG's are interpreted by the Emergency Department Physician who either signs or Co-signs this chart in the absence of a cardiologist.    IMPRESSION: 49-year-old male, Pashto-speaking only presenting with acute onset right-sided sharp chest pain rating to midsternal chest associated with shortness of breath. Concern for NSTEMI, STEMI, cardiac angina, pulmonary embolism. No calf tenderness, low concern for DVT at this point. He is tachycardic in exam room, will obtain EKG, CBC BMP as well as 2 troponins, D-dimer.       EMERGENCY DEPARTMENT COURSE:  ED Course as of 06/01/22 0723   Wed Jun 01, 2022   0457 D-Dimer, Quant: 0.83 [QC]   0516 Troponin, High Sensitivity: <6 [QC]   9085 CXR IMPRESSION:  Clear chest without acute cardiopulmonary process.    [QC]   0603 Troponin, High Sensitivity: <6 [QC]   0701 CT PE IMPRESSION:  1. No pulmonary emboli. 2. Mild dependent ground-glass change favored to represent atelectasis,  superimposed on mild background emphysematous change.    [QC]   0720 Labs unremarkable, T imaging chest x-ray all negative. Resting comfortably in bed, vitals have all WNL. Denies SOB. Discussed with pt via  [facility] that this pain is likely musculoskeletal. Take ibuprofen 600mg every 8 hours for the next couple days. Provided return precautions such as chest pain, SOB, fevers, chills, n/v. Pt verbalizes understanding.  [QC]      ED Course User Index  [QC] Cale Blake MD                PROCEDURES:      CONSULTS:  None        FINAL IMPRESSION      1.  Atypical chest pain          DISPOSITION / PLAN     DISPOSITION    Discharged    PATIENT REFERRED TO:  [facility]  [address] [facility] [place] [address]  [phone]    If symptoms worsen    [address] [address] [phone]  Schedule an appointment as soon as possible for a visit         DISCHARGE MEDICATIONS:  New Prescriptions    IBUPROFEN (ADVIL;MOTRIN) 600 MG TABLET    Take 1 tablet by mouth every 8 hours as needed for Pain       Cale Blake MD  Emergency Medicine Resident    (Please note that portions of thisnote were completed with a voice recognition program.  Efforts were made to edit the dictations but occasionally words are mis-transcribed.)           Cale Blake MD  Resident  06/01/22 1901

## 2022-06-01 NOTE — Clinical Note
Rosa London was seen and treated in our emergency department on 6/1/2022.     Rosa London was seen in the Emergency Department on 6/1/2022 from 0430 am to 0730am.     Eden Dean MD

## 2022-06-01 NOTE — Clinical Note
Eilana Luu was seen and treated in our emergency department on 6/1/2022. Eliana Luu was seen in the Emergency Department on 6/1/2022. Please allow him to return.      Elaine Blue MD
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
## 2022-06-02 LAB
EKG ATRIAL RATE: 81 BPM
EKG P AXIS: 40 DEGREES
EKG P-R INTERVAL: 152 MS
EKG Q-T INTERVAL: 354 MS
EKG QRS DURATION: 96 MS
EKG QTC CALCULATION (BAZETT): 411 MS
EKG R AXIS: 76 DEGREES
EKG T AXIS: 59 DEGREES
EKG VENTRICULAR RATE: 81 BPM

## 2022-06-02 PROCEDURE — 93010 ELECTROCARDIOGRAM REPORT: CPT | Performed by: INTERNAL MEDICINE

## 2022-06-08 ENCOUNTER — HOSPITAL ENCOUNTER (EMERGENCY)
Age: 47
Discharge: HOME | End: 2022-06-08
Attending: EMERGENCY MEDICINE
Payer: COMMERCIAL

## 2022-06-08 ENCOUNTER — APPOINTMENT (OUTPATIENT)
Dept: GENERAL RADIOLOGY | Age: 47
End: 2022-06-08
Payer: COMMERCIAL

## 2022-06-08 VITALS
RESPIRATION RATE: 14 BRPM | HEART RATE: 77 BPM | BODY MASS INDEX: 25.67 KG/M2 | OXYGEN SATURATION: 98 % | TEMPERATURE: 98.2 F | SYSTOLIC BLOOD PRESSURE: 110 MMHG | WEIGHT: 200 LBS | DIASTOLIC BLOOD PRESSURE: 66 MMHG | HEIGHT: 74 IN

## 2022-06-08 DIAGNOSIS — M25.532 LEFT WRIST PAIN: Primary | ICD-10-CM

## 2022-06-08 DIAGNOSIS — G56.03 BILATERAL CARPAL TUNNEL SYNDROME: ICD-10-CM

## 2022-06-08 PROCEDURE — 99283 EMERGENCY DEPT VISIT LOW MDM: CPT

## 2022-06-08 PROCEDURE — 73110 X-RAY EXAM OF WRIST: CPT

## 2022-06-08 PROCEDURE — 6370000000 HC RX 637 (ALT 250 FOR IP): Performed by: STUDENT IN AN ORGANIZED HEALTH CARE EDUCATION/TRAINING PROGRAM

## 2022-06-08 PROCEDURE — 73080 X-RAY EXAM OF ELBOW: CPT

## 2022-06-08 RX ORDER — ACETAMINOPHEN 500 MG
500 TABLET ORAL EVERY 6 HOURS PRN
Qty: 15 TABLET | Refills: 0 | Status: SHIPPED | OUTPATIENT
Start: 2022-06-08 | End: 2022-10-24

## 2022-06-08 RX ORDER — ACETAMINOPHEN 500 MG
1000 TABLET ORAL ONCE
Status: COMPLETED | OUTPATIENT
Start: 2022-06-08 | End: 2022-06-08

## 2022-06-08 RX ORDER — IBUPROFEN 600 MG/1
600 TABLET ORAL EVERY 8 HOURS PRN
Qty: 20 TABLET | Refills: 0 | Status: SHIPPED | OUTPATIENT
Start: 2022-06-08 | End: 2022-08-13

## 2022-06-08 RX ADMIN — ACETAMINOPHEN 1000 MG: 500 TABLET ORAL at 17:55

## 2022-06-08 ASSESSMENT — PAIN SCALES - GENERAL: PAINLEVEL_OUTOF10: 8

## 2022-06-08 ASSESSMENT — ENCOUNTER SYMPTOMS
COUGH: 0
ABDOMINAL PAIN: 0
BACK PAIN: 0
VOMITING: 0
CONSTIPATION: 0
WHEEZING: 0
NAUSEA: 0
SHORTNESS OF BREATH: 0
DIARRHEA: 0

## 2022-06-08 ASSESSMENT — PAIN DESCRIPTION - LOCATION
LOCATION_8: OTHER (COMMENT)
LOCATION: ARM;LEG;PELVIS

## 2022-06-08 ASSESSMENT — PAIN DESCRIPTION - PAIN TYPE: TYPE: ACUTE PAIN

## 2022-06-08 ASSESSMENT — PAIN - FUNCTIONAL ASSESSMENT: PAIN_FUNCTIONAL_ASSESSMENT: 0-10

## 2022-06-08 ASSESSMENT — PAIN DESCRIPTION - INTENSITY: RATING_8: 8

## 2022-06-08 ASSESSMENT — PAIN DESCRIPTION - ORIENTATION: ORIENTATION: RIGHT;LEFT

## 2022-06-08 NOTE — Clinical Note
Swetha Lombardo was seen and treated in our emergency department on 6/8/2022. He may return to work on 06/11/2022. If you have any questions or concerns, please don't hesitate to call.       Haylie Velarde, DO

## 2022-06-08 NOTE — ED NOTES
47 yo male presents with c/o pain to LUE, RUE, LLE, RLE and pelvis  Patient communication with June Yancey  #[id number]  Patient also c/o tingling sensation to left hand radiating up left arm  Patient wants to know cause of this also  Patient spoke to his boss and explained that as a  and doing heavy lifting he is having a lot of pain  Patient was told to come to ER to be checked out to make sure he is ok to work  Patient said if he needs to take time off from work if needed he will/can  Patient given call light and explained by      Sydni Franco, HIGINIO  06/08/22 2688
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
## 2022-06-09 NOTE — ED NOTES
Discharge instructions given with assistance of , pt verbalized understanding. All questions answered.   Pt given cock up splints with education on use of splints, verbalized understanding       Veda Stuart RN  06/08/22 2032

## 2022-06-16 ENCOUNTER — APPOINTMENT (OUTPATIENT)
Dept: GENERAL RADIOLOGY | Age: 47
End: 2022-06-16
Payer: COMMERCIAL

## 2022-06-16 ENCOUNTER — HOSPITAL ENCOUNTER (EMERGENCY)
Age: 47
Discharge: HOME | End: 2022-06-16
Attending: EMERGENCY MEDICINE
Payer: COMMERCIAL

## 2022-06-16 VITALS
TEMPERATURE: 98.8 F | RESPIRATION RATE: 16 BRPM | SYSTOLIC BLOOD PRESSURE: 110 MMHG | BODY MASS INDEX: 25.68 KG/M2 | WEIGHT: 200 LBS | HEART RATE: 72 BPM | DIASTOLIC BLOOD PRESSURE: 77 MMHG | OXYGEN SATURATION: 97 %

## 2022-06-16 DIAGNOSIS — J45.20 MILD INTERMITTENT ASTHMA, UNSPECIFIED WHETHER COMPLICATED: Primary | ICD-10-CM

## 2022-06-16 LAB
SARS-COV-2, RAPID: NOT DETECTED
SPECIMEN DESCRIPTION: NORMAL

## 2022-06-16 PROCEDURE — 6360000002 HC RX W HCPCS: Performed by: STUDENT IN AN ORGANIZED HEALTH CARE EDUCATION/TRAINING PROGRAM

## 2022-06-16 PROCEDURE — 71045 X-RAY EXAM CHEST 1 VIEW: CPT

## 2022-06-16 PROCEDURE — 87635 SARS-COV-2 COVID-19 AMP PRB: CPT

## 2022-06-16 PROCEDURE — 99284 EMERGENCY DEPT VISIT MOD MDM: CPT

## 2022-06-16 PROCEDURE — 6370000000 HC RX 637 (ALT 250 FOR IP): Performed by: STUDENT IN AN ORGANIZED HEALTH CARE EDUCATION/TRAINING PROGRAM

## 2022-06-16 PROCEDURE — 96372 THER/PROPH/DIAG INJ SC/IM: CPT

## 2022-06-16 PROCEDURE — 94640 AIRWAY INHALATION TREATMENT: CPT

## 2022-06-16 RX ORDER — DEXAMETHASONE SODIUM PHOSPHATE 4 MG/ML
4 INJECTION, SOLUTION INTRA-ARTICULAR; INTRALESIONAL; INTRAMUSCULAR; INTRAVENOUS; SOFT TISSUE ONCE
Status: COMPLETED | OUTPATIENT
Start: 2022-06-16 | End: 2022-06-16

## 2022-06-16 RX ORDER — IPRATROPIUM BROMIDE AND ALBUTEROL SULFATE 2.5; .5 MG/3ML; MG/3ML
1 SOLUTION RESPIRATORY (INHALATION) ONCE
Status: COMPLETED | OUTPATIENT
Start: 2022-06-16 | End: 2022-06-16

## 2022-06-16 RX ORDER — ALBUTEROL SULFATE 90 UG/1
2 AEROSOL, METERED RESPIRATORY (INHALATION) 4 TIMES DAILY PRN
Qty: 54 G | Refills: 1 | Status: SHIPPED | OUTPATIENT
Start: 2022-06-16 | End: 2022-09-24

## 2022-06-16 RX ADMIN — IPRATROPIUM BROMIDE AND ALBUTEROL SULFATE 1 AMPULE: .5; 3 SOLUTION RESPIRATORY (INHALATION) at 02:24

## 2022-06-16 RX ADMIN — DEXAMETHASONE SODIUM PHOSPHATE 4 MG: 4 INJECTION, SOLUTION INTRAMUSCULAR; INTRAVENOUS at 02:04

## 2022-06-16 ASSESSMENT — ENCOUNTER SYMPTOMS
SORE THROAT: 0
VOMITING: 0
SINUS PRESSURE: 0
SHORTNESS OF BREATH: 1
WHEEZING: 1
NAUSEA: 0
DIARRHEA: 0
FACIAL SWELLING: 0
CONSTIPATION: 0
TROUBLE SWALLOWING: 0
COUGH: 0
SINUS PAIN: 0
CHEST TIGHTNESS: 0
ABDOMINAL PAIN: 0
COLOR CHANGE: 0

## 2022-06-16 NOTE — ED NOTES
Pt arrived with complaints of asthma flare up. Pt stated that a week ago he was walking home from the [employer] the [place] and his asthma has been flaring up since. Pt stated that he doesn't have any inhalers or treatments available to him at home and that he would like to be given supplies to go home with. Pt placed on pulse ox and bp cuff. Pt has end expiratory wheezes present. RR equal and unlabored. Call light within reach.  at the bedside.  Will continue plan of care      Irene Rai RN  06/16/22 0155

## 2022-06-16 NOTE — ED PROVIDER NOTES
[facility]     Emergency Department     Faculty Attestation    I performed a history and physical examination of the patient and discussed management with the resident. I have reviewed and agree with the residents findings including all diagnostic interpretations, and treatment plans as written. Any areas of disagreement are noted on the chart. I was personally present for the key portions of any procedures. I have documented in the chart those procedures where I was not present during the key portions. I have reviewed the emergency nurses triage note. I agree with the chief complaint, past medical history, past surgical history, allergies, medications, social and family history as documented unless otherwise noted below. Documentation of the HPI, Physical Exam and Medical Decision Making performed by [email or web address] is based on my personal performance of the HPI, PE and MDM. For Physician Assistant/ Nurse Practitioner cases/documentation I have personally evaluated this patient and have completed at least one if not all key elements of the E/M (history, physical exam, and MDM). Additional findings are as noted. 49 yo M c/o asthma & request covid test, no fever, no vomit, non smoker,   pe interpretor used, vss, gcs 15,   Bilateral wheeze, no acute distress,     covid-, cxr -, discharging in stable condition    EKG Interpretation    Interpreted by me      CRITICAL CARE: There was a high probability of clinically significant/life threatening deterioration in this patient's condition which required my urgent intervention. Total critical care time was 5 minutes. This excludes any time for separately reportable procedures.        [facility] Hospital[facility], DO  06/16/22 [facility], DO  06/16/22 [facility], DO  06/16/22 0300

## 2022-06-16 NOTE — ED PROVIDER NOTES
[facility]  ED  Emergency Department Encounter  Emergency Medicine Resident     Pt Name: Monica Soto  MRN: [id number]  [place] 1975  Date of evaluation: 6/16/22  PCP:  No primary care provider on file. CHIEF COMPLAINT       Chief Complaint   Patient presents with    Asthma       HISTORY OFPRESENT ILLNESS  (Location/Symptom, Timing/Onset, Context/Setting, Quality, Duration, Modifying Factors,Severity.)      Monica Soto is a 48 y. o.yo male who presents with concerns of asthma exacerbation. Patient is concerned that he has COVID as he is a  in [employer] and no one wears a mask. Patient denies any fever, cough chills nausea or vomiting. Patient states he has asthma in the past but has not had inhaler refilled since he lived in [facility].  Patient denies any other concerns or complaints at this time. PAST MEDICAL / SURGICAL / SOCIAL / FAMILY HISTORY      has a past medical history of Arthritis, Asthma, and Depression. has a past surgical history that includes meniscectomy; Finger fracture surgery (Left, 03/31/2022); Finger Closed Reduction (Left, 03/31/2022); and Finger surgery (Left, 3/31/2022).      Social History     Socioeconomic History    Marital status: Single     Spouse name: Not on file    Number of children: Not on file    Years of education: Not on file    Highest education level: Not on file   Occupational History    Not on file   Tobacco Use    Smoking status: Current Every Day Smoker     Packs/day: 0.00     Years: 32.00     Pack years: 0.00     Types: E-Cigarettes    Smokeless tobacco: Not on file   Substance and Sexual Activity    Alcohol use: Never    Drug use: Never    Sexual activity: Not on file   Other Topics Concern    Not on file   Social History Narrative    Not on file     Social Determinants of Health     Financial Resource Strain:     Difficulty of Paying Living Expenses: Not on file   Food Insecurity:     Worried About Running Out of Food in the Last Year: Not on file    Ran Out of Food in the Last Year: Not on file   Transportation Needs:     Lack of Transportation (Medical): Not on file    Lack of Transportation (Non-Medical): Not on file   Physical Activity:     Days of Exercise per Week: Not on file    Minutes of Exercise per Session: Not on file   Stress:     Feeling of Stress : Not on file   Social Connections:     Frequency of Communication with Friends and Family: Not on file    Frequency of Social Gatherings with Friends and Family: Not on file    Attends Muslim Services: Not on file    Active Member of [address] [place] [address] [employer] or Organizations: Not on file    Attends Club or Organization Meetings: Not on file    Marital Status: Not on file   Intimate Partner Violence:     Fear of Current or Ex-Partner: Not on file    Emotionally Abused: Not on file    Physically Abused: Not on file    Sexually Abused: Not on file   Housing Stability:     Unable to Pay for Housing in the Last Year: Not on file    Number of [place] in the Last Year: Not on file    Unstable Housing in the Last Year: Not on file       History reviewed. No pertinent family history. Allergies:  Food    Home Medications:  Prior to Admission medications    Medication Sig Start Date End Date Taking? Authorizing Provider   albuterol sulfate HFA (VENTOLIN HFA) 108 (90 Base) MCG/ACT inhaler Inhale 2 puffs into the lungs 4 times daily as needed for Wheezing 6/16/22  Yes Lory Riedel, DO   acetaminophen (TYLENOL) 500 MG tablet Take 1 tablet by mouth every 6 hours as needed for Pain 6/8/22   Thiago Nine, DO   ibuprofen (ADVIL;MOTRIN) 600 MG tablet Take 1 tablet by mouth every 8 hours as needed for Pain 6/8/22   Thiago Nine, DO       REVIEW OFSYSTEMS    (2-9 systems for level 4, 10 or more for level 5)      Review of Systems   Constitutional: Negative for chills, diaphoresis, fatigue and fever.    HENT: Negative for facial swelling, nosebleeds, sinus pressure, sinus pain, sore throat and trouble swallowing. Respiratory: Positive for shortness of breath and wheezing. Negative for cough and chest tightness. Cardiovascular: Negative for chest pain, palpitations and leg swelling. Gastrointestinal: Negative for abdominal pain, constipation, diarrhea, nausea and vomiting. Genitourinary: Negative for dysuria, flank pain and hematuria. Musculoskeletal: Negative for arthralgias, gait problem, neck pain and neck stiffness. Skin: Negative for color change, rash and wound. Neurological: Negative for dizziness, syncope, weakness, light-headedness and headaches. PHYSICAL EXAM   (up to 7 for level 4, 8 or more forlevel 5)      INITIAL VITALS:   ED Triage Vitals   BP Temp Temp src Pulse Resp SpO2 Height Weight   110/77 98.8 oral 72 16 97% -- --       Physical Exam  Constitutional:       General: He is not in acute distress. Appearance: He is normal weight. He is not ill-appearing. HENT:      Head: Normocephalic and atraumatic. Right Ear: External ear normal.      Left Ear: External ear normal.      Nose: Nose normal.   Eyes:      General: No scleral icterus. Extraocular Movements: Extraocular movements intact. Conjunctiva/sclera: Conjunctivae normal.      Pupils: Pupils are equal, round, and reactive to light. Cardiovascular:      Rate and Rhythm: Normal rate and regular rhythm. Pulses: Normal pulses. Heart sounds: Normal heart sounds. Pulmonary:      Effort: Pulmonary effort is normal. No respiratory distress. Breath sounds: Wheezing (Wheezing bilaterally, no respiratory distress) present. No rales. Abdominal:      General: Abdomen is flat. Bowel sounds are normal. There is no distension. Palpations: Abdomen is soft. Tenderness: There is no abdominal tenderness. Musculoskeletal:         General: Normal range of motion. Cervical back: Normal range of motion. No muscular tenderness. Skin:     General: Skin is warm and dry. Neurological:      General: No focal deficit present. Mental Status: He is alert and oriented to person, place, and time. Cranial Nerves: No cranial nerve deficit. DIFFERENTIAL  DIAGNOSIS     PLAN (LABS / IMAGING / EKG):  Orders Placed This Encounter   Procedures    COVID-19, Rapid    XR CHEST PORTABLE       MEDICATIONS ORDERED:  Orders Placed This Encounter   Medications    ipratropium-albuterol (DUONEB) nebulizer solution 1 ampule     Order Specific Question:   Initiate RT Bronchodilator Protocol     Answer: Yes    dexamethasone (DECADRON) injection 4 mg    albuterol sulfate HFA (VENTOLIN HFA) 108 (90 Base) MCG/ACT inhaler     Sig: Inhale 2 puffs into the lungs 4 times daily as needed for Wheezing     Dispense:  54 g     Refill:  1       DDX: Asthma exacerbation, COVID-19, viral URI    Initial MDM/Plan: 48 y.o. male who presents with concern for wheezing and cough. Patient was concerned about COVID as he works in [employer] and no one wears masks. Patient does admit to history of asthma. He is Turkish-speaking and  was used for the entire conversation. Patient is in no acute distress, speaking in full sentences without dropping his oxygen saturations. Does have bilateral wheezing heard throughout. We will plan for breathing treatment, chest x-ray and COVID testing. DIAGNOSTIC RESULTS / EMERGENCYDEPARTMENT COURSE / MDM     LABS:  Labs Reviewed   COVID-19, RAPID         RADIOLOGY:  XR CHEST PORTABLE    Result Date: 6/16/2022  EXAMINATION: ONE XRAY VIEW OF THE CHEST 6/16/2022 2:23 am COMPARISON: 06/01/2022 HISTORY: ORDERING SYSTEM PROVIDED HISTORY: Wheezing TECHNOLOGIST PROVIDED HISTORY: Wheezing FINDINGS: The lungs are without acute focal process. There is no effusion or pneumothorax. The cardiomediastinal silhouette is stable. The osseous structures are stable. No acute process.          EKG      All EKG's are interpreted by the Emergency Department Physicianwho either signs or Co-signs this chart in the absence of a cardiologist.    EMERGENCY DEPARTMENT COURSE:  ED Course as of 06/16/22 0535   Thu Jun 16, 2022   0157 Patient seen and evaluated.  used [CD]   8985 SARS-CoV-2, Rapid: Not Detected [CD]   0965 Chest x-ray unremarkable. Will discharge patient home with rescue inhaler and follow-up with PCP. [CD]   [id number] Using the video  patient was given discharge instructions as well as instructions to use the inhaler. Patient demonstrated understanding. Discharged home [CD]      ED Course User Index  [CD] Neo Vidal DO          PROCEDURES:  None    CONSULTS:  None    CRITICAL CARE:  Please see attending documentation     FINAL IMPRESSION      1.  Mild intermittent asthma, unspecified whether complicated          DISPOSITION / PLAN     DISPOSITION Decision To Discharge 06/16/2022 02:55:46 AM      PATIENT REFERRED TO:  [address] [place] [id number] [phone]  Schedule an appointment as soon as possible for a visit in 3 days  For ER follow-up    [facility]  [address] [address]  [phone]  Go to   If symptoms worsen      DISCHARGE MEDICATIONS:  Discharge Medication List as of 6/16/2022  2:58 AM      START taking these medications    Details   albuterol sulfate HFA (VENTOLIN HFA) 108 (90 Base) MCG/ACT inhaler Inhale 2 puffs into the lungs 4 times daily as needed for Wheezing, Disp-54 g, R-1Print             Neo Vidal DO  Emergency Medicine Resident    (Please note that portions of this note were completed with a voice recognition program.Efforts were made to edit the dictations but occasionally words are mis-transcribed.)        Neo Vidal DO  Resident  06/16/22 6880

## 2022-06-16 NOTE — Clinical Note
Roberto Queen was seen and treated in our emergency department on 6/16/2022. He may return to work on 06/16/2022. If you have any questions or concerns, please don't hesitate to call.       Mariajose Schwarz, DO

## 2022-07-14 ENCOUNTER — HOSPITAL ENCOUNTER (EMERGENCY)
Age: 47
Discharge: HOME | End: 2022-07-14
Attending: EMERGENCY MEDICINE
Payer: COMMERCIAL

## 2022-07-14 VITALS
OXYGEN SATURATION: 98 % | RESPIRATION RATE: 18 BRPM | HEART RATE: 95 BPM | TEMPERATURE: 98.2 F | SYSTOLIC BLOOD PRESSURE: 138 MMHG | DIASTOLIC BLOOD PRESSURE: 93 MMHG

## 2022-07-14 DIAGNOSIS — R19.7 DIARRHEA, UNSPECIFIED TYPE: Primary | ICD-10-CM

## 2022-07-14 LAB
ABSOLUTE EOS #: 0.15 K/UL (ref 0–0.44)
ABSOLUTE IMMATURE GRANULOCYTE: 0.03 K/UL (ref 0–0.3)
ABSOLUTE LYMPH #: 1.77 K/UL (ref 1.1–3.7)
ABSOLUTE MONO #: 0.65 K/UL (ref 0.1–1.2)
ALBUMIN SERPL-MCNC: 4.2 G/DL (ref 3.5–5.2)
ALBUMIN/GLOBULIN RATIO: 1.4 (ref 1–2.5)
ALP BLD-CCNC: 85 U/L (ref 40–129)
ALT SERPL-CCNC: 16 U/L (ref 5–41)
ANION GAP SERPL CALCULATED.3IONS-SCNC: 9 MMOL/L (ref 9–17)
AST SERPL-CCNC: 22 U/L
BASOPHILS # BLD: 0 % (ref 0–2)
BASOPHILS ABSOLUTE: 0.03 K/UL (ref 0–0.2)
BILIRUB SERPL-MCNC: 0.35 MG/DL (ref 0.3–1.2)
BILIRUBIN URINE: NEGATIVE
BUN BLDV-MCNC: 15 MG/DL (ref 6–20)
CALCIUM SERPL-MCNC: 9.4 MG/DL (ref 8.6–10.4)
CHLORIDE BLD-SCNC: 105 MMOL/L (ref 98–107)
CO2: 26 MMOL/L (ref 20–31)
COLOR: YELLOW
COMMENT UA: ABNORMAL
CREAT SERPL-MCNC: 0.7 MG/DL (ref 0.7–1.2)
EOSINOPHILS RELATIVE PERCENT: 2 % (ref 1–4)
GFR AFRICAN AMERICAN: >60 ML/MIN
GFR NON-AFRICAN AMERICAN: >60 ML/MIN
GFR SERPL CREATININE-BSD FRML MDRD: NORMAL ML/MIN/{1.73_M2}
GLUCOSE BLD-MCNC: 83 MG/DL (ref 70–99)
GLUCOSE URINE: NEGATIVE
HCT VFR BLD CALC: 41.2 % (ref 40.7–50.3)
HEMOGLOBIN: 13.4 G/DL (ref 13–17)
IMMATURE GRANULOCYTES: 0 %
KETONES, URINE: ABNORMAL
LEUKOCYTE ESTERASE, URINE: NEGATIVE
LIPASE: 43 U/L (ref 13–60)
LYMPHOCYTES # BLD: 24 % (ref 24–43)
MCH RBC QN AUTO: 28.9 PG (ref 25.2–33.5)
MCHC RBC AUTO-ENTMCNC: 32.5 G/DL (ref 28.4–34.8)
MCV RBC AUTO: 89 FL (ref 82.6–102.9)
MONOCYTES # BLD: 9 % (ref 3–12)
NITRITE, URINE: NEGATIVE
NRBC AUTOMATED: 0 PER 100 WBC
PDW BLD-RTO: 13.2 % (ref 11.8–14.4)
PH UA: 6 (ref 5–8)
PLATELET # BLD: 315 K/UL (ref 138–453)
PMV BLD AUTO: 9.2 FL (ref 8.1–13.5)
POTASSIUM SERPL-SCNC: 3.9 MMOL/L (ref 3.7–5.3)
PROTEIN UA: NEGATIVE
RBC # BLD: 4.63 M/UL (ref 4.21–5.77)
SARS-COV-2, RAPID: NOT DETECTED
SEG NEUTROPHILS: 65 % (ref 36–65)
SEGMENTED NEUTROPHILS ABSOLUTE COUNT: 4.65 K/UL (ref 1.5–8.1)
SODIUM BLD-SCNC: 140 MMOL/L (ref 135–144)
SPECIFIC GRAVITY UA: 1.02 (ref 1–1.03)
SPECIMEN DESCRIPTION: NORMAL
TOTAL PROTEIN: 7.1 G/DL (ref 6.4–8.3)
TURBIDITY: CLEAR
URINE HGB: NEGATIVE
UROBILINOGEN, URINE: NORMAL
WBC # BLD: 7.3 K/UL (ref 3.5–11.3)

## 2022-07-14 PROCEDURE — 87635 SARS-COV-2 COVID-19 AMP PRB: CPT

## 2022-07-14 PROCEDURE — 85025 COMPLETE CBC W/AUTO DIFF WBC: CPT

## 2022-07-14 PROCEDURE — 80053 COMPREHEN METABOLIC PANEL: CPT

## 2022-07-14 PROCEDURE — 83690 ASSAY OF LIPASE: CPT

## 2022-07-14 PROCEDURE — 99284 EMERGENCY DEPT VISIT MOD MDM: CPT

## 2022-07-14 PROCEDURE — 81003 URINALYSIS AUTO W/O SCOPE: CPT

## 2022-07-14 PROCEDURE — 2580000003 HC RX 258: Performed by: STUDENT IN AN ORGANIZED HEALTH CARE EDUCATION/TRAINING PROGRAM

## 2022-07-14 RX ORDER — 0.9 % SODIUM CHLORIDE 0.9 %
1000 INTRAVENOUS SOLUTION INTRAVENOUS ONCE
Status: COMPLETED | OUTPATIENT
Start: 2022-07-14 | End: 2022-07-14

## 2022-07-14 RX ADMIN — SODIUM CHLORIDE 1000 ML: 9 INJECTION, SOLUTION INTRAVENOUS at 18:07

## 2022-07-14 ASSESSMENT — ENCOUNTER SYMPTOMS
SORE THROAT: 0
DIARRHEA: 1
EYE PAIN: 0
ABDOMINAL PAIN: 1
NAUSEA: 0
BLOOD IN STOOL: 0
PHOTOPHOBIA: 0
COLOR CHANGE: 0
SINUS PRESSURE: 0
CONSTIPATION: 0
COUGH: 0
VOMITING: 0
EYE REDNESS: 0
SHORTNESS OF BREATH: 0
CHEST TIGHTNESS: 0
RHINORRHEA: 0

## 2022-07-14 ASSESSMENT — PAIN SCALES - GENERAL: PAINLEVEL_OUTOF10: 7

## 2022-07-14 ASSESSMENT — PAIN - FUNCTIONAL ASSESSMENT: PAIN_FUNCTIONAL_ASSESSMENT: 0-10

## 2022-07-14 NOTE — ED TRIAGE NOTES
Pt alert and oriented x4. Presents to the ED with c/o abdominal pain/diarrhea since last Tuesday. Pt stated he has been taking Imodium daily several times per day to stop from going. Denies any new foods or any significant medical hx. Pt stated he came today because he ran out Imodium and had to call off work due to this. 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 Pt denies any s/s of covid 19. Denies any CP,dizziness, or sob. Vitals obtained and triage completed using   ED .

## 2022-07-14 NOTE — ED PROVIDER NOTES
[facility]  ED  Emergency Department Encounter  EmergencyMedicine Resident     Pt Liz Pierre  MRN: [id number]  Azragfkeisha 1975  Date of evaluation: 7/14/22  PCP:  No primary care provider on file. This patient was evaluated in the Emergency Department for symptoms described in the history of present illness. The patient was evaluated in the context of the global COVID-19 pandemic, which necessitated consideration that the patient might be at risk for infection with the SARS-CoV-2 virus that causes COVID-19. Institutional protocols and algorithms that pertain to the evaluation of patients at risk for COVID-19 are in a state of rapid change based on information released by regulatory bodies including the CDC and federal and state organizations. These policies and algorithms were followed during the patient's care in the ED. 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 male who presents with diarrhea, abdominal pain has been ongoing since Tuesday. Requires a Wallisian video  for assessment. He was at work when he has had persistent watery diarrhea, denying any blood in the stool. Denies any fevers or chills, no cough or congestion. States pain is periumbilical nonradiating, abdomen is soft nonperitoneal.  Nausea or vomiting, no chest pain. Able to tolerate p.o. He is coming here because he is a concern that he may lose his job as his boss keeps sending him home.       PAST MEDICAL / SURGICAL / SOCIAL / FAMILY HISTORY      has a past medical history of Arthritis, Asthma, and Depression. has a past surgical history that includes meniscectomy; Finger fracture surgery (Left, 03/31/2022); Finger Closed Reduction (Left, 03/31/2022); and Finger surgery (Left, 3/31/2022). Social History     Socioeconomic History    Marital status: Single     Spouse name: Not on file    Number of children: Not on file    Years of education: Not on file    Highest education level: Not on file   Occupational History    Not on file   Tobacco Use    Smoking status: Current Every Day Smoker     Packs/day: 0.00     Years: 32.00     Pack years: 0.00     Types: E-Cigarettes    Smokeless tobacco: Not on file   Substance and Sexual Activity    Alcohol use: Never    Drug use: Never    Sexual activity: Not on file   Other Topics Concern    Not on file   Social History Narrative    Not on file     Social Determinants of Health     Financial Resource Strain:     Difficulty of Paying Living Expenses: Not on file   Food Insecurity:     Worried About Running Out of Food in the Last Year: Not on file    Mikael of Food in the Last Year: Not on file   Transportation Needs:     Lack of Transportation (Medical): Not on file    Lack of Transportation (Non-Medical):  Not on file   Physical Activity:     Days of Exercise per Week: Not on file    Minutes of Exercise per Session: Not on file   Stress:     Feeling of Stress : Not on file   Social Connections:     Frequency of Communication with Friends and Family: Not on file    Frequency of Social Gatherings with Friends and Family: Not on file    Attends Alevism Services: Not on file    Active Member of Clubs or Organizations: Not on file    Attends Club or Organization Meetings: Not on file    Marital Status: Not on file   Intimate Partner Violence:     Fear of Current or Ex-Partner: Not on file    Emotionally Abused: Not on file    Physically Abused: Not on file    Sexually Abused: Not on file   Housing Stability:     Unable to Pay for Housing in the Last Year: Not on file    Number of Places Lived in the Last Year: Not on file    Unstable Housing in the Last Year: Not on file       History reviewed. No pertinent family history. Allergies:  Food    Home Medications:  Prior to Admission medications    Medication Sig Start Date End Date Taking? Authorizing Provider   albuterol sulfate HFA (VENTOLIN HFA) 108 (90 Base) MCG/ACT inhaler Inhale 2 puffs into the lungs 4 times daily as needed for Wheezing 6/16/22   Charlie Garza, DO   acetaminophen (TYLENOL) 500 MG tablet Take 1 tablet by mouth every 6 hours as needed for Pain 6/8/22   [facility], DO   ibuprofen (ADVIL;MOTRIN) 600 MG tablet Take 1 tablet by mouth every 8 hours as needed for Pain 6/8/22   [facility], DO       REVIEW OF SYSTEMS    (2-9 systems for level 4, 10 or more for level 5)      Review of Systems   Constitutional: Negative for activity change, chills, diaphoresis, fatigue and fever. HENT: Negative for congestion, rhinorrhea, sinus pressure and sore throat. Eyes: Negative for photophobia, pain and redness. Respiratory: Negative for cough, chest tightness and shortness of breath. Cardiovascular: Negative for chest pain and leg swelling. Gastrointestinal: Positive for abdominal pain and diarrhea. Negative for blood in stool, constipation, nausea and vomiting. Genitourinary: Negative for dysuria, flank pain, frequency and urgency. Musculoskeletal: Negative for arthralgias, myalgias and neck stiffness. Skin: Negative for color change, pallor and rash. Neurological: Negative for dizziness, syncope, weakness, light-headedness, numbness and headaches.          PHYSICAL EXAM   (up to 7 for level 4, 8 or more for level 5)      INITIAL VITALS:   BP (!) 138/93   Pulse (!) 105   Temp 98.2 °F (36.8 °C) (Oral)   Resp 18   SpO2 98%     Physical Exam  Constitutional:  Well developed, Well nourished. HENT:  Normocephalic, Atraumatic, Bilateral external ears normal,  Nose normal.   Neck: Normal range of motion, No stridor. Eyes:   No discharge. Respiratory:   No respiratory distress, Normal breath sounds without any wheezing, rales or rhonchi. Cardiovascular: Normal S1, S2. No rubs, gallops or murmurs. Gastrointestinal:  No organomegaly, no tenderness, no rebound or guarding. No distention,   Musculoskeletal:  No extremity deformity. Lymphatic: No lymphadenopathy noted  Skin:  Warm, Dry,  No rash. Neurologic:  Alert & oriented x 3, Normal motor function,  No focal deficits noted. Psychiatric:  Affect normal, Judgment normal, Mood normal.              DIFFERENTIAL  DIAGNOSIS     PLAN (LABS / IMAGING / EKG):  Orders Placed This Encounter   Procedures    COVID-19, Rapid    Gastrointestinal Panel, Molecular    CBC with Auto Differential    Comprehensive Metabolic Panel    Lipase    Urinalysis with Reflex to Culture       MEDICATIONS ORDERED:  Orders Placed This Encounter   Medications    0.9 % sodium chloride bolus       DDX: Gastroenteritis, E. Coli, C. Difficile, pancreatitis, appendicitis    DIAGNOSTIC RESULTS / EMERGENCY DEPARTMENT COURSE / MDM   LAB RESULTS:  Results for orders placed or performed during the hospital encounter of 07/14/22   COVID-19, Rapid    Specimen: Nasopharyngeal Swab   Result Value Ref Range    Specimen Description . NASOPHARYNGEAL SWAB     SARS-CoV-2, Rapid Not Detected Not Detected   CBC with Auto Differential   Result Value Ref Range    WBC 7.3 3.5 - 11.3 k/uL    RBC 4.63 4.21 - 5.77 m/uL    Hemoglobin 13.4 13.0 - 17.0 g/dL    Hematocrit 41.2 40.7 - 50.3 %    MCV 89.0 82.6 - 102.9 fL    MCH 28.9 25.2 - 33.5 pg    MCHC 32.5 28.4 - 34.8 g/dL    RDW 13.2 11.8 - 14.4 %    Platelets 922 263 - 824 k/uL    MPV 9.2 8.1 - 13.5 fL    NRBC Automated 0.0 0.0 per 100 WBC    Seg Neutrophils 65 36 - 65 %    Lymphocytes 24 24 - 43 %    Monocytes 9 3 - 12 % Eosinophils % 2 1 - 4 %    Basophils 0 0 - 2 %    Immature Granulocytes 0 0 %    Segs Absolute 4.65 1.50 - 8.10 k/uL    Absolute Lymph # 1.77 1.10 - 3.70 k/uL    Absolute Mono # 0.65 0.10 - 1.20 k/uL    Absolute Eos # 0.15 0.00 - 0.44 k/uL    Basophils Absolute 0.03 0.00 - 0.20 k/uL    Absolute Immature Granulocyte 0.03 0.00 - 0.30 k/uL   Comprehensive Metabolic Panel   Result Value Ref Range    Glucose 83 70 - 99 mg/dL    BUN 15 6 - 20 mg/dL    CREATININE 0.70 0.70 - 1.20 mg/dL    Calcium 9.4 8.6 - 10.4 mg/dL    Sodium 140 135 - 144 mmol/L    Potassium 3.9 3.7 - 5.3 mmol/L    Chloride 105 98 - 107 mmol/L    CO2 26 20 - 31 mmol/L    Anion Gap 9 9 - 17 mmol/L    Alkaline Phosphatase 85 40 - 129 U/L    ALT 16 5 - 41 U/L    AST 22 <40 U/L    Total Bilirubin 0.35 0.3 - 1.2 mg/dL    Total Protein 7.1 6.4 - 8.3 g/dL    Albumin 4.2 3.5 - 5.2 g/dL    Albumin/Globulin Ratio 1.4 1.0 - 2.5    GFR Non-African American >60 >60 mL/min    GFR African American >60 >60 mL/min    GFR Comment         Lipase   Result Value Ref Range    Lipase 43 13 - 60 U/L         RADIOLOGY:  No orders to display          IMPRESSION: 53-year-old male with history of asthma presenting with diarrhea that is been ongoing since Tuesday. Tried Imodium without much relief. No blood in the stool, no nausea or vomiting. Is likely a gastroenteritis viral illness. Patient is concerned he may have COVID, and is concerned that he may lose his job due to the symptoms. Obtain COVID swab, CBC and CMP and lipase the umbilical pain. IV fluids. Will also Obtain stool studies.       EMERGENCY DEPARTMENT COURSE:  ED Course as of 07/14/22 1903   Thu Jul 14, 2022 1902 Pt care signed out to Dr. Nataliia Fay [QC]   8484 If urinalysis and labs come back unremarkable, and patient is still unable to provide stool sample, okay for discharge [QC]      ED Course User Index  [QC] Bossman Lizama MD               PROCEDURES:      CONSULTS:  None        FINAL IMPRESSION pending    DISPOSITION / PLAN     DISPOSITION    pending    PATIENT REFERRED TO:  No follow-up provider specified.     DISCHARGE MEDICATIONS:  New Prescriptions    No medications on file       Cydney Barth MD  Emergency Medicine Resident PGY2    (Please note that portions of thisnote were completed with a voice recognition program.  Efforts were made to edit the dictations but occasionally words are mis-transcribed.)        Cydney Barth MD  Resident  07/14/22 0530

## 2022-07-14 NOTE — ED PROVIDER NOTES
[address] ED  Emergency Department  Emergency Medicine Resident Sign-out     Care of Pepe Zabala was assumed from Dr. 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 The patient's initial evaluation and plan have been discussed with the prior provider who initially evaluated the patient. EMERGENCY DEPARTMENT COURSE / MEDICAL DECISION MAKING:       MEDICATIONS GIVEN:  Orders Placed This Encounter   Medications    0.9 % sodium chloride bolus       LABS / RADIOLOGY:     Labs Reviewed   URINALYSIS WITH REFLEX TO CULTURE - Abnormal; Notable for the following components:       Result Value    Ketones, Urine TRACE (*)     All other components within normal limits   COVID-19, RAPID   GASTROINTESTINAL PANEL, MOLECULAR   CBC WITH AUTO DIFFERENTIAL   COMPREHENSIVE METABOLIC PANEL   LIPASE       XR CHEST PORTABLE    Result Date: 6/16/2022  EXAMINATION: ONE XRAY VIEW OF THE CHEST 6/16/2022 2:23 am COMPARISON: 06/01/2022 HISTORY: ORDERING SYSTEM PROVIDED HISTORY: Wheezing TECHNOLOGIST PROVIDED HISTORY: Wheezing FINDINGS: The lungs are without acute focal process. There is no effusion or pneumothorax. The cardiomediastinal silhouette is stable. The osseous structures are stable. No acute process. RECENT VITALS:     Temp: 98.2 °F (36.8 °C),  Heart Rate: 95, Resp: 18, BP: (!) 138/93, SpO2: 98 %      This patient is a 48 y.o. Male with diarrhea. Patient given IV fluids, will check stool samples however patient able to give stool sample. We will follow-up on UA.   Plan on discharging patient      ED Course as of 07/15/22 2247   Thu Jul 14, 2022   1902 Pt care signed out to Dr. Mukund Castanon [QC]   0661 If urinalysis and labs come back unremarkable, and patient is still unable to provide stool sample, okay for discharge [QC]      ED Course User Index  [QC] Elvi Watson MD       OUTSTANDING TASKS / RECOMMENDATIONS:    F/U UA maegan  DC patient     FINAL IMPRESSION:     1.  Diarrhea, unspecified type        DISPOSITION:         DISPOSITION:  [x]  Discharge   []  Transfer -    []  Admission -     []  Against Medical Advice   []  Eloped   FOLLOW-UP: [facility]  [address]       DISCHARGE MEDICATIONS: Discharge Medication List as of 7/14/2022  9:19 PM             Jose Ballard MD  Emergency Medicine Resident  [facility]        Jose Ballard MD  Resident  07/15/22 Lolis Wright MD  Resident  07/15/22 7470

## 2022-07-15 NOTE — ED NOTES
Pt still unable to have a bowel movement at this time. Pt stated he does not think he will be able to go any time soon.      Ferdinand Harrison RN  07/14/22 2043

## 2022-08-04 NOTE — ED PROVIDER NOTES
[address]  ED  eMERGENCY dEPARTMENT eNCOUnter   Attending Attestation     Pt Name: Dileep Ferguson  MRN: [id number]  [place] 1975  Date of evaluation: 7/14/22       Dileep Ferguson is a 50 y.o. 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 I reviewed the residents note and agree with the documented findings and plan of care. Any areas of disagreement are noted on the chart. I was personally present for the key portions of any procedures. I have documented in the chart those procedures where I was not present during the key portions. I have personally reviewed all images and agree with the resident's interpretation. I have reviewed the emergency nurses triage note. I agree with the chief complaint, past medical history, past surgical history, allergies, medications, social and family history as documented unless otherwise noted below. Documentation of the HPI, Physical Exam and Medical Decision Making performed by medical students or scribes is based on my personal performance of the HPI, PE and MDM. For Phys Assistant/ Nurse Practitioner cases/documentation I have had a face to face evaluation of this patient and have completed at least one if not all key elements of the E/M (history, physical exam, and MDM). Additional findings are as noted. For APC cases I have personally evaluated and examined the patient in conjunction with the APC and agree with the treatment plan and disposition of the patient as recorded by the APC.     Gely Garcia MD  Attending Emergency  Physician       Prachi Paez MD  08/04/22 1031

## 2022-08-13 ENCOUNTER — APPOINTMENT (OUTPATIENT)
Dept: GENERAL RADIOLOGY | Age: 47
End: 2022-08-13
Payer: COMMERCIAL

## 2022-08-13 ENCOUNTER — HOSPITAL ENCOUNTER (EMERGENCY)
Age: 47
Discharge: HOME | End: 2022-08-13
Attending: EMERGENCY MEDICINE
Payer: COMMERCIAL

## 2022-08-13 VITALS
OXYGEN SATURATION: 96 % | RESPIRATION RATE: 16 BRPM | TEMPERATURE: 98.6 F | DIASTOLIC BLOOD PRESSURE: 77 MMHG | HEART RATE: 95 BPM | SYSTOLIC BLOOD PRESSURE: 109 MMHG

## 2022-08-13 DIAGNOSIS — G56.02 CARPAL TUNNEL SYNDROME OF LEFT WRIST: Primary | ICD-10-CM

## 2022-08-13 PROCEDURE — 73110 X-RAY EXAM OF WRIST: CPT

## 2022-08-13 PROCEDURE — 6370000000 HC RX 637 (ALT 250 FOR IP): Performed by: STUDENT IN AN ORGANIZED HEALTH CARE EDUCATION/TRAINING PROGRAM

## 2022-08-13 PROCEDURE — 99283 EMERGENCY DEPT VISIT LOW MDM: CPT

## 2022-08-13 RX ORDER — IBUPROFEN 600 MG/1
600 TABLET ORAL EVERY 6 HOURS PRN
Qty: 360 TABLET | Refills: 1 | Status: SHIPPED | OUTPATIENT
Start: 2022-08-13

## 2022-08-13 RX ORDER — IBUPROFEN 400 MG/1
600 TABLET ORAL ONCE
Status: COMPLETED | OUTPATIENT
Start: 2022-08-13 | End: 2022-08-13

## 2022-08-13 RX ORDER — ACETAMINOPHEN 500 MG
500 TABLET ORAL EVERY 6 HOURS PRN
Qty: 15 TABLET | Refills: 0 | Status: SHIPPED | OUTPATIENT
Start: 2022-08-13 | End: 2022-10-24

## 2022-08-13 RX ADMIN — IBUPROFEN 600 MG: 400 TABLET, FILM COATED ORAL at 03:17

## 2022-08-13 ASSESSMENT — PAIN SCALES - GENERAL: PAINLEVEL_OUTOF10: 10

## 2022-08-13 NOTE — Clinical Note
Josseline Valencia was seen and treated in our emergency department on 8/13/2022. He may return to work on 08/15/2022. Patient should wear wrist brace to help with pain/numbness symptoms. If you have any questions or concerns, please don't hesitate to call.       Luisito Dolan MD

## 2022-08-13 NOTE — ED PROVIDER NOTES
[facility]     Emergency Department     Faculty Attestation    I performed a history and physical examination of the patient and discussed management with the resident. I have reviewed and agree with the residents findings including all diagnostic interpretations, and treatment plans as written. Any areas of disagreement are noted on the chart. I was personally present for the key portions of any procedures. I have documented in the chart those procedures where I was not present during the key portions. I have reviewed the emergency nurses triage note. I agree with the chief complaint, past medical history, past surgical history, allergies, medications, social and family history as documented unless otherwise noted below. Documentation of the HPI, Physical Exam and Medical Decision Making performed by danielleibjanine is based on my personal performance of the HPI, PE and MDM. For Physician Assistant/ Nurse Practitioner cases/documentation I have personally evaluated this patient and have completed at least one if not all key elements of the E/M (history, physical exam, and MDM). Additional findings are as noted. 49 yo M c/o intermittent L hand discomfort, related to overuse while at work, no fever, no injury, pt is L handed,   Interpretor assisting with hx \ pe  Pe vss gcs 15, no cervical tenderness, crepitus or deformity, L upper extremity nv intact, no deformity,     I feel pt having radicular type process, I feel pt stable for out pt tx,   Xr L wrist > soft tissue swelling only,     EKG Interpretation    Interpreted by me      CRITICAL CARE: There was a high probability of clinically significant/life threatening deterioration in this patient's condition which required my urgent intervention. Total critical care time was 5 minutes. This excludes any time for separately reportable procedures.        Mich Villavicencio DO  08/13/22 Nguyen Cano Zhen Damian,   08/13/22 7130

## 2022-08-13 NOTE — DISCHARGE INSTRUCTIONS
Please wear wrist splint throughout the day and at night to help with swelling and to help improve your numbness in pain symptoms. Tylenol can be taken every 6 hours. Ibuprofen can be taken every 6 hours. It is recommended you alternate the two every three hours.      Example pain medication schedule:  - 9am: Tylenol  - 12 pm/noon: Ibuprofen  - 3pm: Tylenol  - 6pm: Ibuprofen

## 2022-08-15 ASSESSMENT — ENCOUNTER SYMPTOMS
NAUSEA: 0
RHINORRHEA: 0
SHORTNESS OF BREATH: 0
VOMITING: 0
ABDOMINAL PAIN: 0
EYE REDNESS: 0

## 2022-08-15 NOTE — ED PROVIDER NOTES
[facility] ED  Emergency Department Encounter  Emergency Medicine Resident     Pt Name: Speedy Pathak  MRN: [id number]  Azragfkeisha 1975  Date of evaluation: 8/15/22  PCP:  No primary care provider on file. This patient was evaluated in the Emergency Department for symptoms described in the history of present illness. The patient was evaluated in the context of the global COVID-19 pandemic, which necessitated consideration that the patient might be at risk for infection with the SARS-CoV-2 virus that causes COVID-19. Institutional protocols and algorithms that pertain to the evaluation of patients at risk for COVID-19 are in a state of rapid change based on information released by regulatory bodies including the CDC and federal and state organizations. These policies and algorithms were followed during the patient's care in the ED. CHIEF COMPLAINT       Chief Complaint   Patient presents with    Hand Pain    Arm Pain     HISTORY OFPRESENT ILLNESS  (Location/Symptom, Timing/Onset, Context/Setting, Quality, Duration, Modifying Alline Arellano.)      Speedy Pathak is a 50 y.o. male who presents with numbness and tingling in bilateral hands, left greater than right. He states he woke in the middle of the [employer] with pain in his left hand. He notes that he had to quit a job at [employer] due to the numbness in his hands. He often rides an electronic bike and feels like this is affecting his wrists. No specific injury has occurred however. Denies neck pain. PAST MEDICAL / SURGICAL / SOCIAL / FAMILY HISTORY      has a past medical history of Arthritis, Asthma, and Depression. has a past surgical history that includes meniscectomy; Finger fracture surgery (Left, 03/31/2022); Finger Closed Reduction (Left, 03/31/2022); and Finger surgery (Left, 3/31/2022).     Social History     Socioeconomic History    Marital status: Single     Spouse name: Not on file    Number of children: Not on file    Years of education: Not on file    Highest education level: Not on file   Occupational History    Not on file   Tobacco Use    Smoking status: Every Day     Packs/day: 0.00     Years: 32.00     Pack years: 0.00     Types: E-Cigarettes, Cigarettes    Smokeless tobacco: Not on file   Substance and Sexual Activity    Alcohol use: Never    Drug use: Never    Sexual activity: Not on file   Other Topics Concern    Not on file   Social History Narrative    Not on file     Social Determinants of Health     Financial Resource Strain: Not on file   Food Insecurity: Not on file   Transportation Needs: Not on file   Physical Activity: Not on file   Stress: Not on file   Social Connections: Not on file   Intimate Partner Violence: Not on file   Housing Stability: Not on file       History reviewed. No pertinent family history. Allergies:  Food    Home Medications:  Prior to Admission medications    Medication Sig Start Date End Date Taking? Authorizing Provider   acetaminophen (TYLENOL) 500 MG tablet Take 1 tablet by mouth every 6 hours as needed for Pain 8/13/22  Yes Gregorio Casey MD   ibuprofen (ADVIL;MOTRIN) 600 MG tablet Take 1 tablet by mouth every 6 hours as needed for Pain 8/13/22  Yes Gregorio Casey MD   albuterol sulfate HFA (VENTOLIN HFA) 108 (90 Base) MCG/ACT inhaler Inhale 2 puffs into the lungs 4 times daily as needed for Wheezing 6/16/22   Cris Padgett, DO   acetaminophen (TYLENOL) 500 MG tablet Take 1 tablet by mouth every 6 hours as needed for Pain 6/8/22   Jocelyn Alvarado DO       REVIEW OF SYSTEMS    (2-9 systems for level 4, 10 or more for level 5)      Review of Systems   Constitutional:  Negative for fever. HENT:  Negative for congestion and rhinorrhea. Eyes:  Negative for redness. Respiratory:  Negative for shortness of breath. Cardiovascular:  Negative for chest pain. Gastrointestinal:  Negative for abdominal pain, nausea and vomiting. Genitourinary:  Negative for dysuria. Musculoskeletal:  Positive for myalgias. Negative for gait problem, neck pain and neck stiffness. Skin:  Negative for wound. Neurological:  Positive for numbness. Negative for headaches. PHYSICAL EXAM   (up to 7 for level 4, 8 or more for level 5)     INITIAL VITALS:    oral temperature is 98.6 °F (37 °C). His blood pressure is 109/77 and his pulse is 95. His respiration is 16 and oxygen saturation is 96%. Physical Exam  Constitutional:       General: He is not in acute distress. Appearance: Normal appearance. He is not ill-appearing. HENT:      Head: Normocephalic. Eyes:      Extraocular Movements: Extraocular movements intact. Pupils: Pupils are equal, round, and reactive to light. Cardiovascular:      Rate and Rhythm: Normal rate and regular rhythm. Pulses: Normal pulses. Heart sounds: Normal heart sounds. Pulmonary:      Effort: Pulmonary effort is normal. No respiratory distress. Breath sounds: Normal breath sounds. Abdominal:      General: There is no distension. Palpations: Abdomen is soft. Tenderness: There is no abdominal tenderness. There is no guarding. Musculoskeletal:         General: No swelling. Normal range of motion. Cervical back: Normal range of motion. No tenderness. Comments: Numbness over left index and middle finger and partially over the radial side of the ring finger on the left. Not endorsing numbness over the thumb at present but states he \"usually does. \" No numbness throughout right hand. Grasp 5/5 strength in bilateral hands. Full ROM of bilateral fingers, hands and wrists. Endorses increased pain/numbness with phalen's maneuver. No tenderness to palpation of bilateral hands/wrists. Compartments are nice and soft. Lymphadenopathy:      Cervical: No cervical adenopathy. Neurological:      General: No focal deficit present.       Mental Status: He is alert and oriented to person, place, and time. DIFFERENTIAL  DIAGNOSIS     PLAN (LABS / IMAGING / EKG):  Orders Placed This Encounter   Procedures    XR WRIST LEFT (MIN 3 VIEWS)    [facility]ace, Left       MEDICATIONS ORDERED:  Orders Placed This Encounter   Medications    ibuprofen (ADVIL;MOTRIN) tablet 600 mg    acetaminophen (TYLENOL) 500 MG tablet     Sig: Take 1 tablet by mouth every 6 hours as needed for Pain     Dispense:  15 tablet     Refill:  0    ibuprofen (ADVIL;MOTRIN) 600 MG tablet     Sig: Take 1 tablet by mouth every 6 hours as needed for Pain     Dispense:  360 tablet     Refill:  1       DDX: carpal tunnel, compartment syndrome    Initial MDM/Plan: 50 y.o. male who presents with left hand numbness and pain waking him up from sleep. Numbness has been ongoing >1 month but acutely worse today. Will obtain XR imaging. Distribution of numbness somewhat consistent with carpal tunnel syndrome, if imaging negative, will plant to discharge home with supportive care and follow up with ortho. DIAGNOSTIC RESULTS / EMERGENCY DEPARTMENT COURSE / MDM     LABS:  Labs Reviewed - No data to display      RADIOLOGY:  No results found. EKG  none    All EKG's are interpreted by the Emergency Department Physician who either signs or Co-signs this chart in the absence of a cardiologist.          [place] Coma Scale  Eye Opening: Spontaneous  Best Verbal Response: Oriented  Best Motor Response: Obeys commands  Tasha Coma Scale Score: 15    EMERGENCY DEPARTMENT COURSE:  Patient with persistent numbness and pain in bilateral hands, left greater than right with acute worsening which caused him to wake tonight. XR imaging showed some edema but no acute fracture. Neuro findings appear consistent with carpal tunnel syndrome. Recommended brace and antiinflammatories. Would also avoid riding bike for some time to rest wrists.     PROCEDURES:  None    CONSULTS:  None    CRITICAL CARE:  Please see attending note    FINAL IMPRESSION      1.  Carpal tunnel syndrome of left wrist        DISPOSITION / PLAN     DISPOSITION Decision To Discharge 08/13/2022 03:52:38 AM      PATIENTREFERRED TO:  Gerald Batistai INT [facility]  [address] [facility] [address]  [phone]  Schedule an appointment as soon as possible for a visit   to [address], [address] [facility]  [phone]  Schedule an appointment as soon as possible for a visit         DISCHARGE MEDICATIONS:  Discharge Medication List as of 8/13/2022  3:53 AM          Erum Nevarez MD  Emergency Medicine Resident    (Please note that portions of this note were completed with a voice recognition program.  Efforts were made to edit the dictations but occasionally words are mis-transcribed.)       Pavel Chaparro MD  Resident  08/15/22 6445

## 2022-08-27 ENCOUNTER — HOSPITAL ENCOUNTER (EMERGENCY)
Age: 47
Discharge: HOME | End: 2022-08-27
Attending: EMERGENCY MEDICINE
Payer: COMMERCIAL

## 2022-08-27 VITALS
RESPIRATION RATE: 18 BRPM | TEMPERATURE: 97.9 F | OXYGEN SATURATION: 99 % | SYSTOLIC BLOOD PRESSURE: 137 MMHG | DIASTOLIC BLOOD PRESSURE: 87 MMHG | HEART RATE: 81 BPM

## 2022-08-27 DIAGNOSIS — A09 DIARRHEA OF INFECTIOUS ORIGIN: Primary | ICD-10-CM

## 2022-08-27 PROCEDURE — 99283 EMERGENCY DEPT VISIT LOW MDM: CPT

## 2022-08-27 ASSESSMENT — ENCOUNTER SYMPTOMS
COUGH: 0
SHORTNESS OF BREATH: 0
NAUSEA: 0
DIARRHEA: 1
VOMITING: 0
ABDOMINAL PAIN: 0

## 2022-08-27 NOTE — DISCHARGE INSTR - COC
Continuity of Care Form    Patient Name: Pardeep Reyes   :  1975  MRN:  [id number]    Admit date:  2022  Discharge date:  ***    Code Status Order: No Order   Advance Directives:     Admitting Physician:  No admitting provider for patient encounter. PCP: No primary care provider on file. Discharging Nurse: [facility] Unit/Room#:   Discharging Unit Phone Number: ***    Emergency Contact:   Extended Emergency Contact Information  Primary Emergency Contact: Jazmin Rees  Home Phone: [phone]  Relation: Brother/Sister   needed? No    Past Surgical History:  Past Surgical History:   Procedure Laterality Date    FINGER CLOSED REDUCTION Left 2022    pinning left 5th metacarpal    FINGER FRACTURE SURGERY Left 2022    CLOSED REDUCTION PRECUTANEOUS PINNING LEFT 5TH METACARPAL    FINGER SURGERY Left 3/31/2022    CLOSED REDUCTION PRECUTANEOUS PINNING LEFT 5TH METACARPAL performed by April Barraza MD at [place] [address]         Immunization History:   Immunization History   Administered Date(s) Administered    COVID-19, PFIZER PURPLE top, DILUTE for use, (age 15 y+), 30mcg/0.3mL 2021, 2021    Tdap (Boostrix, Adacel) 2022       Active Problems: There is no problem list on file for this patient.       Isolation/Infection:   Isolation            No Isolation          Patient Infection Status       Infection Onset Added Last Indicated Last Indicated By Review Planned Expiration Resolved Resolved By    C-diff Rule Out 22 Gastrointestinal Panel, Molecular (Ordered)        Resolved    COVID-19 (Rule Out) 22 COVID-19, Rapid (Ordered)   22 Rule-Out Test Resulted    COVID-19 (Rule Out) 22 COVID-19, Rapid (Ordered)   22 Rule-Out Test Resulted    COVID-19 (Rule Out) 03/10/22 03/10/22 03/10/22 COVID-19, Rapid (Ordered)   03/10/22 Rule-Out Test

## 2022-08-27 NOTE — ED PROVIDER NOTES
level: Not on file   Occupational History    Not on file   Tobacco Use    Smoking status: Every Day     Packs/day: 0.00     Years: 32.00     Pack years: 0.00     Types: E-Cigarettes, Cigarettes    Smokeless tobacco: Not on file   Substance and Sexual Activity    Alcohol use: Never    Drug use: Never    Sexual activity: Not on file   Other Topics Concern    Not on file   Social History Narrative    Not on file     Social Determinants of Health     Financial Resource Strain: Not on file   Food Insecurity: Not on file   Transportation Needs: Not on file   Physical Activity: Not on file   Stress: Not on file   Social Connections: Not on file   Intimate Partner Violence: Not on file   Housing Stability: Not on file       History reviewed. No pertinent family history. Allergies:  Food    Home Medications:  Prior to Admission medications    Medication Sig Start Date End Date Taking? Authorizing Provider   bismuth subsalicylate (PEPTO-BISMOL) 262 MG/15ML suspension Take 30 mLs by mouth every 8 hours as needed for Indigestion 8/27/22 9/1/22 Yes Jay Holder, DO   acetaminophen (TYLENOL) 500 MG tablet Take 1 tablet by mouth every 6 hours as needed for Pain 8/13/22   Bret Fox MD   ibuprofen (ADVIL;MOTRIN) 600 MG tablet Take 1 tablet by mouth every 6 hours as needed for Pain 8/13/22   Bret Fox MD   albuterol sulfate HFA (VENTOLIN HFA) 108 (90 Base) MCG/ACT inhaler Inhale 2 puffs into the lungs 4 times daily as needed for Wheezing 6/16/22   Radha Lilly DO   acetaminophen (TYLENOL) 500 MG tablet Take 1 tablet by mouth every 6 hours as needed for Pain 6/8/22   Pascual Geuvara, DO       REVIEW OF SYSTEMS    (2-9 systems for level 4, 10 or more for level 5)      Review of Systems   Constitutional:  Negative for fever. Respiratory:  Negative for cough and shortness of breath. Cardiovascular:  Negative for chest pain. Gastrointestinal:  Positive for diarrhea.  Negative for abdominal pain, nausea and vomiting. Genitourinary:  Negative for decreased urine volume. Musculoskeletal:  Negative for myalgias. Skin:  Negative for rash. PHYSICAL EXAM   (up to 7 for level 4, 8 or more for level 5)     INITIAL VITALS:    oral temperature is 97.9 °F (36.6 °C). His blood pressure is 137/87 and his pulse is 81. His respiration is 18 and oxygen saturation is 99%. Physical Exam  Constitutional:       General: He is not in acute distress. Appearance: Normal appearance. He is not ill-appearing or toxic-appearing. HENT:      Mouth/Throat:      Mouth: Mucous membranes are moist.      Pharynx: Oropharynx is clear. Cardiovascular:      Rate and Rhythm: Normal rate and regular rhythm. Pulses: Normal pulses. Heart sounds: No murmur heard. No gallop. Pulmonary:      Effort: Pulmonary effort is normal. No respiratory distress. Breath sounds: Normal breath sounds. No wheezing. Abdominal:      General: Abdomen is flat. Palpations: Abdomen is soft. Tenderness: There is no abdominal tenderness. There is no guarding or rebound. Skin:     General: Skin is warm and dry. Findings: No rash. Neurological:      Mental Status: He is alert. DIFFERENTIAL  DIAGNOSIS     PLAN (LABS / IMAGING / EKG):  No orders of the defined types were placed in this encounter. MEDICATIONS ORDERED:  Orders Placed This Encounter   Medications    bismuth subsalicylate (PEPTO-BISMOL) 262 MG/15ML suspension     Sig: Take 30 mLs by mouth every 8 hours as needed for Indigestion     Dispense:  118 mL     Refill:  0       DDX: Viral illness, malabsorption    Initial MDM/Plan: 50 y.o. male who presents with complaints of diarrhea ongoing for 2 days. Able to tolerate p.o. Seen and examined, no distress, vitals unremarkable, patient is well-appearing, speaking full sentences, not ill nontoxic in appearance.   His examination largely unremarkable, abdomen is benign, soft, nontender to palpation, no rebound or guarding. Negative heeltap. Given well appearance of patient and unremarkable abdominal exam he is medically cleared and okay for discharge, patient was given note for work as well as prescription for Pepto-Bismol and referral to gastroenterologist encouraged to follow-up and make an appointment as well as schedule appoint with PCP for ER follow-up. DIAGNOSTIC RESULTS / EMERGENCY DEPARTMENT COURSE / MDM     LABS:  Labs Reviewed - No data to display      RADIOLOGY:  No results found. EMERGENCY DEPARTMENT COURSE:     Physical examination unremarkable. Given referral to gastroenterology, return precautions were discussed. Discharged home. PROCEDURES:  None    CONSULTS:  None    CRITICAL CARE:  Please see attending note    FINAL IMPRESSION      1.  Diarrhea of infectious origin          DISPOSITION / PLAN     DISPOSITION Decision To Discharge 08/27/2022 06:11:20 AM        PATIENTREFERRED TO:  [place] [address]  [phone]  Schedule an appointment as soon as possible for a visit   As needed      DISCHARGE MEDICATIONS:  Discharge Medication List as of 8/27/2022  6:14 AM        START taking these medications    Details   bismuth subsalicylate (PEPTO-BISMOL) 262 MG/15ML suspension Take 30 mLs by mouth every 8 hours as needed for Indigestion, Disp-118 mL, R-0Print             Mario Heck DO  EmergencyMedicine Resident    (Please note that portions of this note were completed with a voice recognition program.  Efforts were made to edit the dictations but occasionally words are mis-transcribed.)        Mario Heck DO  Resident  08/27/22 1085

## 2022-08-27 NOTE — ED NOTES
Pt to ER for c/o diarrhea. Pt states that for the last few days he has not been able to eat anything without it going right through him. Pt states that his friend gave him \"Immodium\" and it did not work because he was still having diarrhea. No acute distress noted, RR even and NL. Information provided by .       Arnol Rodriguez RN  08/27/22 0110

## 2022-08-27 NOTE — DISCHARGE INSTRUCTIONS
Avoid drinking alcohol or drinks that have caffeine it. Drink plenty of water or fluids like Gatorade. Avoid eating spicy foods. You should eat bland foods like bananas, rice, apple sauce and toast / crackers. Using Pepto-Bismol may be beneficial, but long-term use can turn your stools black. PLEASE RETURN TO THE EMERGENCY DEPARTMENT IMMEDIATELY for worsening symptoms, increase in the amount of diarrhea you are having, abdominal pain, blood in your stool, vomiting up blood, persistent nausea and/or vomiting, or if you develop any concerning symptoms such as: high fever not relieved by acetaminophen (Tylenol) and/or ibuprofen (Motrin / Advil), chills, shortness of breath, chest pain, feeling of your heart fluttering or racing, numbness, loss of consciousness, weakness or tingling in the arms or legs or change in color of the extremities, changes in mental status, persistent headache, blurry vision, loss of bladder / bowel control, unable to follow up with your physician, or other any other care or concern.

## 2022-08-27 NOTE — Clinical Note
Surekha Morales was seen and treated in our emergency department on 8/27/2022. He was examined in the emergency department early in the morning on 8/27/2022, he was discharged at 6:30 am on 8/27/2022. He is okay to return to work today.     Saurabh Figueroa, DO

## 2022-09-18 ENCOUNTER — HOSPITAL ENCOUNTER (EMERGENCY)
Age: 47
Discharge: HOME | End: 2022-09-18
Attending: EMERGENCY MEDICINE
Payer: COMMERCIAL

## 2022-09-18 ENCOUNTER — APPOINTMENT (OUTPATIENT)
Dept: GENERAL RADIOLOGY | Age: 47
End: 2022-09-18
Payer: COMMERCIAL

## 2022-09-18 VITALS
BODY MASS INDEX: 24.38 KG/M2 | SYSTOLIC BLOOD PRESSURE: 134 MMHG | OXYGEN SATURATION: 99 % | WEIGHT: 190 LBS | RESPIRATION RATE: 20 BRPM | HEART RATE: 83 BPM | HEIGHT: 74 IN | TEMPERATURE: 97.9 F | DIASTOLIC BLOOD PRESSURE: 74 MMHG

## 2022-09-18 DIAGNOSIS — J44.9 CHRONIC OBSTRUCTIVE PULMONARY DISEASE, UNSPECIFIED COPD TYPE (HCC): Primary | ICD-10-CM

## 2022-09-18 DIAGNOSIS — J45.21 INTERMITTENT ASTHMA WITH ACUTE EXACERBATION, UNSPECIFIED ASTHMA SEVERITY: ICD-10-CM

## 2022-09-18 LAB
SARS-COV-2, RAPID: NOT DETECTED
SPECIMEN DESCRIPTION: NORMAL

## 2022-09-18 PROCEDURE — 94761 N-INVAS EAR/PLS OXIMETRY MLT: CPT

## 2022-09-18 PROCEDURE — 6370000000 HC RX 637 (ALT 250 FOR IP)

## 2022-09-18 PROCEDURE — 94664 DEMO&/EVAL PT USE INHALER: CPT

## 2022-09-18 PROCEDURE — 6360000002 HC RX W HCPCS

## 2022-09-18 PROCEDURE — 94640 AIRWAY INHALATION TREATMENT: CPT

## 2022-09-18 PROCEDURE — 71045 X-RAY EXAM CHEST 1 VIEW: CPT

## 2022-09-18 PROCEDURE — 87635 SARS-COV-2 COVID-19 AMP PRB: CPT

## 2022-09-18 PROCEDURE — 99284 EMERGENCY DEPT VISIT MOD MDM: CPT

## 2022-09-18 RX ORDER — PREDNISONE 20 MG/1
60 TABLET ORAL ONCE
Status: COMPLETED | OUTPATIENT
Start: 2022-09-18 | End: 2022-09-18

## 2022-09-18 RX ORDER — AZITHROMYCIN 250 MG/1
TABLET, FILM COATED ORAL
Qty: 6 TABLET | Refills: 0 | Status: SHIPPED | OUTPATIENT
Start: 2022-09-18 | End: 2022-09-23

## 2022-09-18 RX ORDER — MAGNESIUM SULFATE IN WATER 40 MG/ML
2000 INJECTION, SOLUTION INTRAVENOUS ONCE
Status: DISCONTINUED | OUTPATIENT
Start: 2022-09-18 | End: 2022-09-18

## 2022-09-18 RX ORDER — PREDNISONE 50 MG/1
50 TABLET ORAL DAILY
Qty: 4 TABLET | Refills: 0 | Status: SHIPPED | OUTPATIENT
Start: 2022-09-18

## 2022-09-18 RX ORDER — ALBUTEROL SULFATE 2.5 MG/3ML
2.5 SOLUTION RESPIRATORY (INHALATION)
Status: DISCONTINUED | OUTPATIENT
Start: 2022-09-18 | End: 2022-09-18 | Stop reason: HOSPADM

## 2022-09-18 RX ORDER — ALBUTEROL SULFATE 2.5 MG/3ML
15 SOLUTION RESPIRATORY (INHALATION)
Status: DISCONTINUED | OUTPATIENT
Start: 2022-09-18 | End: 2022-09-18 | Stop reason: HOSPADM

## 2022-09-18 RX ADMIN — PREDNISONE 60 MG: 20 TABLET ORAL at 10:52

## 2022-09-18 RX ADMIN — ALBUTEROL SULFATE 15 MG: 2.5 SOLUTION RESPIRATORY (INHALATION) at 10:29

## 2022-09-18 RX ADMIN — IPRATROPIUM BROMIDE 0.5 MG: 0.5 SOLUTION RESPIRATORY (INHALATION) at 10:29

## 2022-09-18 ASSESSMENT — ENCOUNTER SYMPTOMS
SHORTNESS OF BREATH: 0
CHEST TIGHTNESS: 0
NAUSEA: 0
ABDOMINAL PAIN: 0
WHEEZING: 0
BACK PAIN: 0
ABDOMINAL DISTENTION: 0
TROUBLE SWALLOWING: 0
COUGH: 1
SORE THROAT: 0
VOMITING: 0

## 2022-09-18 ASSESSMENT — PAIN - FUNCTIONAL ASSESSMENT: PAIN_FUNCTIONAL_ASSESSMENT: NONE - DENIES PAIN

## 2022-09-18 NOTE — ED PROVIDER NOTES
albuterol inhaler which he is trying to use with no effect, he did have a nebulizer treatment machine that he left in [facility].  Today he has coarse lung sounds with inspiratory and expiratory wheeze, he has a productive cough, no fevers or chills, does have some nasal congestion. No fevers at home, no chest pain. Plan for COPD pathway including nebs, steroids, chest x-ray. We will get an EKG, if he improves we may build to discharge him with outpatient treatment. During his stay patient was speaking and expansive sentences, showing no signs of respite distress however did have answer expiratory wheezes. He was given multiple nebulizer treatments, work-up was otherwise reassuring, patient offered admission however desired to go home, we prescribed COPD treatment, give him a spacer and conducted teaching on spacer use with his MDI, offered refills and patient stated he would follow-up with his primary care doctor tomorrow.     (Please note that portions of this note were completed with a voice recognition program.  Efforts were made to edit the dictations but occasionally words are mis-transcribed.)      Margarito Finch MD,, MD  Attending Emergency Physician          Margarito Finch MD  09/22/22 8734

## 2022-09-18 NOTE — Clinical Note
Pardeep Reyes was seen and treated in our emergency department on 9/18/2022. He may return to work on 09/19/2022. If you have any questions or concerns, please don't hesitate to call.       Janiya Gil MD

## 2022-09-18 NOTE — DISCHARGE INSTRUCTIONS
Please call and make an appointment with your PCP tomorrow. Take your prednisone as prescribed. Take Pepcid (famotidine - over the counter) every day while you are taking the steroids. If you are a diabetic, you should check your blood sugar more frequently while taking prednisone. Use your inhaler or nebulizer as prescribed, or at minimum every 4 hours while you are having an asthma attack. Being around people that smoke, [place] houses, weather change can cause an asthma exacerbation. PLEASE RETURN TO THE EMERGENCY DEPARTMENT IMMEDIATELY for worsening symptoms of shortness of breath, wheezing, change in the amount of sputum that you cough up or a change in the color of your sputum, using your inhaler more frequently or if your inhaler only lasts up to 2 hours, or if you develop any concerning symptoms such as: high fever not relieved by acetaminophen (Tylenol) and/or ibuprofen (Motrin / Advil), chills, shortness of breath, chest pain, feeling of your heart fluttering or racing, persistent nausea and/or vomiting, numbness, weakness or tingling in the arms or legs or change in color of the extremities, changes in mental status, persistent headache, blurry vision, unable to follow up with your physician, or other any other care or concern.

## 2022-09-18 NOTE — ED NOTES
The following labs were labeled with appropriate pt sticker and tubed to lab:     [] Blue     [] Lavender   [] on ice  [] Green/yellow  [] Green/black [] on ice  [] Roma Milder  [] on ice  [] Yellow  [] Red  [] Pink  [] ABG  [] VBG    [x] COVID-19 swab    [x] Rapid  [] PCR  [] Flu swab  [] Peds Viral Panel     [] Urine Sample  [] Pelvic Cultures  [] Blood Cultures  [] X 2  [] STREP Cultures     Jacquelyn Dumas, HIGINIO  09/18/22 1050

## 2022-09-18 NOTE — ED PROVIDER NOTES
[facility]  ED  Emergency Department Encounter  Emergency Medicine Resident     Pt Safia Espinoza  MRN: [id number]  [place] 1975  Date of evaluation: 9/18/22  PCP:  No primary care provider on file. CHIEF COMPLAINT       Chief Complaint   Patient presents with    Asthma       HISTORY OF PRESENT ILLNESS  (Location/Symptom, Timing/Onset, Context/Setting, Quality, Duration, Modifying Factors, Severity.)      Jackeline Stovall is a 50 y.o. male who presents with difficulty breathing. Patient has a past medical history significant for asthma, states he uses an albuterol rescue inhaler. Patient states that starting a few days ago he started to get cough congestion, his symptoms progressively worsened and last night he started to feel chest tightness and had some difficulty breathing. Patient states he has been taking over-the-counter decongestions, but did not help his symptoms. Patient endorses cough, congestion, shortness of breath and difficulty breathing. Patient denies any headache, change in vision, chest pain, abdominal pain, swelling in his legs. Patient states he smokes on a daily basis. Patient is a Nepali-speaking male, and a  was used during the encounter. PAST MEDICAL / SURGICAL / SOCIAL / FAMILY HISTORY      has a past medical history of Arthritis, Asthma, and Depression. has a past surgical history that includes meniscectomy; Finger fracture surgery (Left, 03/31/2022); Finger Closed Reduction (Left, 03/31/2022); and Finger surgery (Left, 3/31/2022).       Social History     Socioeconomic History    Marital status: Single     Spouse name: Not on file    Number of children: Not on file    Years of education: Not on file    Highest education level: Not on file   Occupational History    Not on file   Tobacco Use    Smoking status: Every Day     Packs/day: 0.00     Years: 32.00     Pack years: 0.00     Types: E-Cigarettes, Cigarettes Smokeless tobacco: Never   Substance and Sexual Activity    Alcohol use: Never    Drug use: Never    Sexual activity: Not on file   Other Topics Concern    Not on file   Social History Narrative    Not on file     Social Determinants of Health     Financial Resource Strain: Not on file   Food Insecurity: Not on file   Transportation Needs: Not on file   Physical Activity: Not on file   Stress: Not on file   Social Connections: Not on file   Intimate Partner Violence: Not on file   Housing Stability: Not on file       History reviewed. No pertinent family history. Allergies:  Shellfish-derived products and Food    Home Medications:  Prior to Admission medications    Medication Sig Start Date End Date Taking? Authorizing Provider   azithromycin (ZITHROMAX) 250 MG tablet Take 2 tablets by mouth daily for 1 day, THEN 1 tablet daily for 4 days. 9/18/22 9/23/22 Yes Raf Segovia MD   predniSONE (DELTASONE) 50 MG tablet Take 1 tablet by mouth daily 9/18/22  Yes Raf Segovia MD   acetaminophen (TYLENOL) 500 MG tablet Take 1 tablet by mouth every 6 hours as needed for Pain 8/13/22   Jemal Caldera MD   ibuprofen (ADVIL;MOTRIN) 600 MG tablet Take 1 tablet by mouth every 6 hours as needed for Pain 8/13/22   Jemal Caldera MD   albuterol sulfate HFA (VENTOLIN HFA) 108 (90 Base) MCG/ACT inhaler Inhale 2 puffs into the lungs 4 times daily as needed for Wheezing 6/16/22   [facility] Sender, DO   acetaminophen (TYLENOL) 500 MG tablet Take 1 tablet by mouth every 6 hours as needed for Pain 6/8/22   Silvano Rides, DO       REVIEW OF SYSTEMS    (2-9 systems for level 4, 10 or more for level 5)      Review of Systems   Constitutional:  Negative for activity change, appetite change, chills, fatigue and fever. HENT:  Positive for congestion. Negative for sore throat and trouble swallowing. Respiratory:  Positive for cough. Negative for chest tightness, shortness of breath and wheezing.     Cardiovascular: Negative for chest pain. Gastrointestinal:  Negative for abdominal distention, abdominal pain, nausea and vomiting. Genitourinary:  Negative for dysuria. Musculoskeletal:  Negative for back pain and joint swelling. Neurological:  Negative for weakness, numbness and headaches. Psychiatric/Behavioral:  Negative for agitation and confusion. PHYSICAL EXAM   (up to 7 for level 4, 8 or more for level 5)      INITIAL VITALS:   /74   Pulse 83   Temp 97.9 °F (36.6 °C) (Oral)   Resp 20   Ht 6' 2\" (1.88 m)   Wt 190 lb (86.2 kg)   SpO2 99%   BMI 24.39 kg/m²     Physical Exam  Constitutional:       General: He is not in acute distress. Appearance: Normal appearance. He is not ill-appearing or toxic-appearing. HENT:      Head: Normocephalic and atraumatic. Eyes:      Extraocular Movements: Extraocular movements intact. Pupils: Pupils are equal, round, and reactive to light. Cardiovascular:      Rate and Rhythm: Normal rate and regular rhythm. Pulmonary:      Effort: No respiratory distress. Breath sounds: Wheezing present. Abdominal:      General: Abdomen is flat. There is no distension. Palpations: Abdomen is soft. Tenderness: There is no abdominal tenderness. Musculoskeletal:      Right lower leg: No edema. Left lower leg: No edema. Neurological:      General: No focal deficit present. Mental Status: He is alert and oriented to person, place, and time. Mental status is at baseline. Psychiatric:         Mood and Affect: Mood normal.         Behavior: Behavior normal.         Thought Content:  Thought content normal.       DIFFERENTIAL  DIAGNOSIS     PLAN (LABS / IMAGING / EKG):  Orders Placed This Encounter   Procedures    COVID-19, Rapid    XR CHEST PORTABLE       MEDICATIONS ORDERED:  Orders Placed This Encounter   Medications    DISCONTD: magnesium sulfate 2000 mg in 50 mL IVPB premix    predniSONE (DELTASONE) tablet 60 mg    DISCONTD: albuterol (PROVENTIL) nebulizer solution 2.5 mg     Order Specific Question:   Initiate RT Bronchodilator Protocol     Answer:   Yes - ED protocol    DISCONTD: ipratropium (ATROVENT) 0.02 % nebulizer solution 0.5 mg     Order Specific Question:   Initiate RT Bronchodilator Protocol     Answer:   Yes - ED protocol    DISCONTD: albuterol (PROVENTIL) nebulizer solution 15 mg     Order Specific Question:   Initiate RT Bronchodilator Protocol     Answer:   Yes - ED protocol    DISCONTD: ipratropium (ATROVENT) 0.02 % nebulizer solution 0.5 mg     Order Specific Question:   Initiate RT Bronchodilator Protocol     Answer:   Yes - ED protocol    azithromycin (ZITHROMAX) 250 MG tablet     Sig: Take 2 tablets by mouth daily for 1 day, THEN 1 tablet daily for 4 days. Dispense:  6 tablet     Refill:  0    predniSONE (DELTASONE) 50 MG tablet     Sig: Take 1 tablet by mouth daily     Dispense:  4 tablet     Refill:  0       DDX: Pneumonia, sinusitis, foreign body, URI, viral illness, asthma/ COPD, allergic rhinitis, GERD, ACE- inhibitor use        DIAGNOSTIC RESULTS / EMERGENCY DEPARTMENT COURSE / MDM   LAB RESULTS:  Results for orders placed or performed during the hospital encounter of 09/18/22   COVID-19, Rapid    Specimen: Nasopharyngeal Swab   Result Value Ref Range    Specimen Description . NASOPHARYNGEAL SWAB     SARS-CoV-2, Rapid Not Detected Not Detected       IMPRESSION:     RADIOLOGY:  XR CHEST PORTABLE   Final Result   Essentially unchanged findings, with mild underlying   emphysematous/atelectatic/fibrotic change; subtle ground-glass unrelated to   atelectasis at either base not excluded.       RECOMMENDATION:   Follow-up chest x-ray as per COVID-19 results and respiratory status               EKG      All EKG's are interpreted by the Emergency Department Physician who either signs or Co-signs this chart in the absence of a cardiologist.    EMERGENCY DEPARTMENT COURSE:  Patient presents emergency department with a 2-day history of difficulty breathing and shortness of breath. Patient has past medical history significant for asthma which he takes rescue inhaler for. Patient states that his rescue inhaler and over-the-counter decongestions have not improved his symptoms. Patient states that he smokes cigarettes and gets frequent asthma exacerbations. On physical exam the patient is nontoxic and well-appearing. Patient has bilateral wheezing appreciated. Patient's vital signs are within normal limits, patient is satting at 99%. Most likely differential is COPD or asthma exacerbation. In order to work-up this patient's symptoms we will obtain a chest x-ray and EKG. patient's COVID test was negative. Patient's chest x-ray showed unchanged findings, with mild underlying emphysematous atelectasis fibrotic change. Subtle groundglass unrelated atelectasis. Respiratory therapy evaluated the patient and gave the patient 3 breathing treatments. Patient states that he had to breathe better. Due to this we will give the patient a albuterol spacer for his inhaler. Instructed the patient on how to use it and proper use. We will discharge the patient with instructions to follow-up with his primary care provider tomorrow. ED Course as of 09/18/22 1841   Sun Sep 18, 2022   1435 XR CHEST PORTABLE  IMPRESSION:  Essentially unchanged findings, with mild underlying  emphysematous/atelectatic/fibrotic change; subtle ground-glass unrelated to  atelectasis at either base not excluded. [MW]      ED Course User Index  [MW] Laretta Cockayne, MD       No notes of [facility] Admission Criteria type on file. PROCEDURES:      CONSULTS:  None    CRITICAL CARE:      FINAL IMPRESSION      1. Chronic obstructive pulmonary disease, unspecified COPD type ([address]    2.  Intermittent asthma with acute exacerbation, unspecified asthma severity          DISPOSITION / PLAN     DISPOSITION Decision To Discharge 09/18/2022 01:05:53 PM      PATIENT REFERRED TO:  [facility] ED  [address] [facility] [address] [address]  [phone]  Go to   If symptoms worsen    DISCHARGE MEDICATIONS:  Discharge Medication List as of 9/18/2022  1:22 PM        START taking these medications    Details   azithromycin (ZITHROMAX) 250 MG tablet Take 2 tablets by mouth daily for 1 day, THEN 1 tablet daily for 4 days. , Disp-6 tablet, R-0Print      predniSONE (DELTASONE) 50 MG tablet Take 1 tablet by mouth daily, Disp-4 tablet, R-0Print             Smith Turcios MD  Emergency Medicine Resident    (Please note that portions of thisnote were completed with a voice recognition program.  Efforts were made to edit the dictations but occasionally words are mis-transcribed.)        Smith Turcios MD  Resident  09/18/22 [id number]

## 2022-09-23 ENCOUNTER — HOSPITAL ENCOUNTER (EMERGENCY)
Age: 47
Discharge: HOME | End: 2022-09-24
Attending: EMERGENCY MEDICINE
Payer: COMMERCIAL

## 2022-09-23 VITALS
OXYGEN SATURATION: 97 % | RESPIRATION RATE: 16 BRPM | SYSTOLIC BLOOD PRESSURE: 127 MMHG | DIASTOLIC BLOOD PRESSURE: 84 MMHG | TEMPERATURE: 97.7 F | HEART RATE: 76 BPM

## 2022-09-23 DIAGNOSIS — J44.9 CHRONIC OBSTRUCTIVE PULMONARY DISEASE, UNSPECIFIED COPD TYPE (HCC): Primary | ICD-10-CM

## 2022-09-23 PROCEDURE — 94640 AIRWAY INHALATION TREATMENT: CPT

## 2022-09-23 PROCEDURE — 99283 EMERGENCY DEPT VISIT LOW MDM: CPT

## 2022-09-23 PROCEDURE — 6370000000 HC RX 637 (ALT 250 FOR IP)

## 2022-09-23 RX ORDER — IPRATROPIUM BROMIDE AND ALBUTEROL SULFATE 2.5; .5 MG/3ML; MG/3ML
1 SOLUTION RESPIRATORY (INHALATION) EVERY 4 HOURS PRN
Status: DISCONTINUED | OUTPATIENT
Start: 2022-09-23 | End: 2022-09-24 | Stop reason: HOSPADM

## 2022-09-23 RX ADMIN — IPRATROPIUM BROMIDE AND ALBUTEROL SULFATE 1 AMPULE: .5; 2.5 SOLUTION RESPIRATORY (INHALATION) at 23:30

## 2022-09-23 ASSESSMENT — ENCOUNTER SYMPTOMS
VOMITING: 0
RHINORRHEA: 1
SORE THROAT: 0
NAUSEA: 0
ABDOMINAL PAIN: 0
CONSTIPATION: 0
WHEEZING: 1
COUGH: 1
DIARRHEA: 0

## 2022-09-23 ASSESSMENT — PAIN - FUNCTIONAL ASSESSMENT: PAIN_FUNCTIONAL_ASSESSMENT: NONE - DENIES PAIN

## 2022-09-23 NOTE — Clinical Note
Millicent Lucio was seen and treated in our emergency department on 9/23/2022. He may return to work on 09/25/2022. If you have any questions or concerns, please don't hesitate to call.       Caitlin Bates MD

## 2022-09-24 RX ORDER — ALBUTEROL SULFATE 90 UG/1
1-2 AEROSOL, METERED RESPIRATORY (INHALATION) EVERY 4 HOURS PRN
Qty: 18 G | Refills: 0 | Status: SHIPPED | OUTPATIENT
Start: 2022-09-23

## 2022-09-24 NOTE — ED PROVIDER NOTES
I performed a history and physical examination of the patient and discussed management with the resident. I reviewed the residents note and agree with the documented findings and plan of care. Any areas of disagreement are noted on the chart. I was personally present for the key portions of any procedures. I have documented in the chart those procedures where I was not present during the key portions. I have reviewed the emergency nurses triage note. I agree with the chief complaint, past medical history, past surgical history, allergies, medications, social and family history as documented unless otherwise noted below. Documentation of the HPI, Physical Exam and Medical Decision Making performed by medical students or scribes is based on my personal performance of the HPI, PE and MDM. For Phys Assistant/ Nurse Practitioner cases/documentation I have personally evaluated this patient and have completed at least one if not all key elements of the E/M (history, physical exam, and MDM). I find the patient's history and physical exam are consistent with the NP/PA documentation. I agree with the care provided, treatment rendered, disposition and followup plan. Additional findings are as noted. Yannick Mccoy. Hilaria Mason MD  Attending Emergency  Physician        [place]ymraes-speaking only patient presented to the emerged part apparently with complaints of having lost his spacer for his metered-dose inhaler which he received when he was evaluated here 4 to 5 days ago. Patient apparently has a history of asthma. Patient was interviewed with the help of an  however the patient is quite voluble and it was challenging to determine exactly what problem he was presenting with. His significant other apparently is employed here at the hospital and we have at his urging paged her to come to the emergency department to provide a more cogent history. On examination the patient is awake and alert.   He is cooperative and responsive. He is speaking for minutes at a time without any obvious difficulty breathing. I reviewed his vital signs which are normal.  I have reviewed the documentation from the previous visit and confirmed that he was given prescriptions for azithromycin, ibuprofen, steroids, and an albuterol metered-dose inhaler. Josef Rinaldi MD  09/23/22 7929      The patient's girlfriend has now responded and she indicates that the patient apparently lost his prescription for his metered-dose inhaler and a spacer and that the primary reason that he is here tonight. He apparently completed his course of antibiotics and steroids. On auscultation patient demonstrates scattered extra wheezes bilaterally throughout. There is no rhonchi, rales, or stridor. Impression: Exacerbation of COPD. Plan: Patient will receive an albuterol aerosol treatment and we will discharge him with new prescriptions for his metered-dose inhaler and a spacer. He has an appointment to follow-up with a primary care provider in 2 weeks. He is asked to return to the emergency department should his symptoms worsen, persist, progress, recur. I have also reviewed the chest x-ray from 5 days ago as well as the radiology interpretation. There is no obvious acute infiltrate.      Josef Rinaldi MD  09/23/22 7039       Josef Rinaldi MD  09/23/22 0067

## 2022-09-24 NOTE — ED PROVIDER NOTES
[facility] ED  Emergency Department Encounter  Emergency Medicine Resident     Pt Jenny Burk  MRN: [id number]  [place] 1975  Date of evaluation: 9/23/22  PCP:  No primary care provider on file. CHIEF COMPLAINT       Chief Complaint   Patient presents with    Cough       HISTORY OF PRESENT ILLNESS  (Location/Symptom, Timing/Onset, Context/Setting, Quality, Duration, Modifying Factors, Severity.)      René Joseph is a 50 y.o. male who presents with cough and shortness of breath. Patient was seen at [facility] emergency department on 9/18/2022 for similar complaints. He was diagnosed with COPD exacerbation, discharged on azithromycin and 4 days of prednisone. He states that due to the colder weather he has been having some asthma attacks. He states that he was given prescriptions however it is unclear whether or not he filled them. He does state that he lost his spacer for his inhaler and that helped him significantly and he is here to get a new spacer. Patient's fiancé called to bedside. She states that he finishes prescriptions for Zithromax and prednisone. However she states that he came to the emergency department tonight because he lost his prescription for albuterol and he lost his spacer. He hopes to receive a new prescription for albuterol and spacer. Patient denies chest pain abdominal pain, vomiting. Clinical  used during encounter. Patient's fiancé also arrived and assisted in translation. PAST MEDICAL / SURGICAL / SOCIAL / FAMILY HISTORY      has a past medical history of Arthritis, Asthma, and Depression. has a past surgical history that includes meniscectomy; Finger fracture surgery (Left, 03/31/2022); Finger Closed Reduction (Left, 03/31/2022); and Finger surgery (Left, 3/31/2022).       Social History     Socioeconomic History    Marital status: Single     Spouse name: Not on file    Number of children: Not on file    Years of education: Not on file    Highest education level: Not on file   Occupational History    Not on file   Tobacco Use    Smoking status: Every Day     Packs/day: 0.00     Years: 32.00     Pack years: 0.00     Types: E-Cigarettes, Cigarettes    Smokeless tobacco: Never   Substance and Sexual Activity    Alcohol use: Never    Drug use: Never    Sexual activity: Not on file   Other Topics Concern    Not on file   Social History Narrative    Not on file     Social Determinants of Health     Financial Resource Strain: Not on file   Food Insecurity: Not on file   Transportation Needs: Not on file   Physical Activity: Not on file   Stress: Not on file   Social Connections: Not on file   Intimate Partner Violence: Not on file   Housing Stability: Not on file       No family history on file. Allergies:  Shellfish-derived products and Food    Home Medications:  Prior to Admission medications    Medication Sig Start Date End Date Taking? Authorizing Provider   albuterol sulfate HFA (PROVENTIL HFA) 108 (90 Base) MCG/ACT inhaler Inhale 1-2 puffs into the lungs every 4 hours as needed for Wheezing or Shortness of Breath (Space out to every 6 hours as symptoms improve) Space out to every 6 hours as symptoms improve. 9/23/22  Yes Jame Pineda MD   predniSONE (DELTASONE) 50 MG tablet Take 1 tablet by mouth daily 9/18/22   Freya Ramos MD   acetaminophen (TYLENOL) 500 MG tablet Take 1 tablet by mouth every 6 hours as needed for Pain 8/13/22   Dayna [facility] MD   ibuprofen (ADVIL;MOTRIN) 600 MG tablet Take 1 tablet by mouth every 6 hours as needed for Pain 8/13/22   Dayna [facility] MD   acetaminophen (TYLENOL) 500 MG tablet Take 1 tablet by mouth every 6 hours as needed for Pain 6/8/22   Michael Ip, DO       REVIEW OF SYSTEMS    (2-9 systems for level 4, 10 or more for level 5)      Review of Systems   HENT:  Positive for congestion and rhinorrhea. Negative for sore throat.     Respiratory: Positive for cough and wheezing. Cardiovascular:  Negative for chest pain. Gastrointestinal:  Negative for abdominal pain, constipation, diarrhea, nausea and vomiting. Review of systems difficult to obtain due to language barrier  PHYSICAL EXAM   (up to 7 for level 4, 8 or more for level 5)      INITIAL VITALS:   /84   Pulse 76   Temp 97.7 °F (36.5 °C) (Oral)   Resp 16   SpO2 97%     Physical Exam  Constitutional:       Appearance: Normal appearance. He is normal weight. HENT:      Head: Normocephalic and atraumatic. Mouth/Throat:      Mouth: Mucous membranes are moist.      Pharynx: Oropharynx is clear. Eyes:      Extraocular Movements: Extraocular movements intact. Pupils: Pupils are equal, round, and reactive to light. Cardiovascular:      Rate and Rhythm: Normal rate and regular rhythm. Pulses: Normal pulses. Heart sounds: Normal heart sounds. Pulmonary:      Effort: Pulmonary effort is normal.      Breath sounds: Wheezing present. Abdominal:      Palpations: Abdomen is soft. Tenderness: There is no abdominal tenderness. Musculoskeletal:         General: Normal range of motion. Cervical back: Normal range of motion. Skin:     General: Skin is warm and dry. Capillary Refill: Capillary refill takes less than 2 seconds. Neurological:      General: No focal deficit present. Mental Status: He is alert and oriented to person, place, and time. Psychiatric:         Mood and Affect: Mood normal.         Behavior: Behavior normal.       DIFFERENTIAL  DIAGNOSIS     PLAN (LABS / IMAGING / EKG):  No orders of the defined types were placed in this encounter.       MEDICATIONS ORDERED:  Orders Placed This Encounter   Medications    DISCONTD: ipratropium-albuterol (DUONEB) nebulizer solution 1 ampule     Order Specific Question:   Initiate RT Bronchodilator Protocol     Answer:   Yes - ED protocol    albuterol sulfate HFA (PROVENTIL HFA) 108 (90 Base) MCG/ACT inhaler     Sig: Inhale 1-2 puffs into the lungs every 4 hours as needed for Wheezing or Shortness of Breath (Space out to every 6 hours as symptoms improve) Space out to every 6 hours as symptoms improve. Dispense:  18 g     Refill:  0       DDX: COPD exacerbation, viral URI, asthma    MDM: Patient is a 78-year-old male with past medical history of COPD. He presents with complaints of losing his albuterol inhaler and spacer. He was recently seen on 9/18/2022, was discharged COPD exacerbation given prescriptions for Zithromax and prednisone. States that his breathing is unchanged from 5 days ago. Denies chest pain, abdominal pain, nausea, vomiting. Patient is awake, alert, GCS 15, speaking for minutes at a time without pause, nontoxic-appearing, not in acute distress. Patient has mil wheezing throughout. Plan on treating with nebulizer treatment and will discharge with new albuterol prescription, spacer, follow-up and return precautions. DIAGNOSTIC RESULTS / EMERGENCY DEPARTMENT COURSE / MDM   LAB RESULTS:  No results found for this visit on 09/23/22. RADIOLOGY:  No orders to display         EKG  None    All EKG's are interpreted by the Emergency Department Physician who either signs or Co-signs this chart in the absence of a cardiologist.    EMERGENCY DEPARTMENT COURSE:         No notes of EC Admission Criteria type on file. PROCEDURES:  None    CONSULTS:  None    CRITICAL CARE:  None      FINAL IMPRESSION      1. Chronic obstructive pulmonary disease, unspecified COPD type ([facility] [address]          DISPOSITION / PLAN     DISPOSITION Decision To Discharge 09/23/2022 11:58:19 PM      PATIENT REFERRED TO:  No follow-up provider specified.     DISCHARGE MEDICATIONS:  Discharge Medication List as of 9/24/2022 12:03 AM          Geetha Mccormick MD  Emergency Medicine Resident    (Please note that portions of thisnote were completed with a voice recognition program.  Efforts were made to edit the dictations but occasionally words are mis-transcribed.)       Amira Seymour MD  Resident  09/25/22 3881

## 2022-09-24 NOTE — ED TRIAGE NOTES
Pt presents to ED with unproductive cough for 2 weeks. Pt states he lost his inhaler spacer and that was helping him cough it all up. Pt is alert, oriented, and ambulatory.

## 2022-10-16 ENCOUNTER — HOSPITAL ENCOUNTER (EMERGENCY)
Age: 47
Discharge: HOME | End: 2022-10-17
Attending: EMERGENCY MEDICINE
Payer: COMMERCIAL

## 2022-10-16 ENCOUNTER — APPOINTMENT (OUTPATIENT)
Dept: GENERAL RADIOLOGY | Age: 47
End: 2022-10-16
Payer: COMMERCIAL

## 2022-10-16 VITALS
HEART RATE: 83 BPM | RESPIRATION RATE: 20 BRPM | OXYGEN SATURATION: 98 % | BODY MASS INDEX: 26.03 KG/M2 | TEMPERATURE: 97.7 F | WEIGHT: 202.8 LBS | HEIGHT: 74 IN | SYSTOLIC BLOOD PRESSURE: 138 MMHG | DIASTOLIC BLOOD PRESSURE: 83 MMHG

## 2022-10-16 DIAGNOSIS — M54.9 ACUTE RIGHT-SIDED BACK PAIN, UNSPECIFIED BACK LOCATION: Primary | ICD-10-CM

## 2022-10-16 PROCEDURE — 6360000002 HC RX W HCPCS

## 2022-10-16 PROCEDURE — 71045 X-RAY EXAM CHEST 1 VIEW: CPT

## 2022-10-16 PROCEDURE — 99284 EMERGENCY DEPT VISIT MOD MDM: CPT

## 2022-10-16 PROCEDURE — 6370000000 HC RX 637 (ALT 250 FOR IP)

## 2022-10-16 PROCEDURE — 96372 THER/PROPH/DIAG INJ SC/IM: CPT

## 2022-10-16 PROCEDURE — 73030 X-RAY EXAM OF SHOULDER: CPT

## 2022-10-16 RX ORDER — LIDOCAINE 4 G/G
1 PATCH TOPICAL ONCE
Status: DISCONTINUED | OUTPATIENT
Start: 2022-10-16 | End: 2022-10-17 | Stop reason: HOSPADM

## 2022-10-16 RX ORDER — KETOROLAC TROMETHAMINE 30 MG/ML
30 INJECTION, SOLUTION INTRAMUSCULAR; INTRAVENOUS ONCE
Status: DISCONTINUED | OUTPATIENT
Start: 2022-10-16 | End: 2022-10-16

## 2022-10-16 RX ORDER — KETOROLAC TROMETHAMINE 30 MG/ML
30 INJECTION, SOLUTION INTRAMUSCULAR; INTRAVENOUS ONCE
Status: COMPLETED | OUTPATIENT
Start: 2022-10-17 | End: 2022-10-16

## 2022-10-16 RX ADMIN — KETOROLAC TROMETHAMINE 30 MG: 30 INJECTION, SOLUTION INTRAMUSCULAR at 23:59

## 2022-10-16 ASSESSMENT — PAIN DESCRIPTION - LOCATION: LOCATION: BACK

## 2022-10-16 ASSESSMENT — PAIN SCALES - GENERAL: PAINLEVEL_OUTOF10: 10

## 2022-10-16 ASSESSMENT — PAIN - FUNCTIONAL ASSESSMENT: PAIN_FUNCTIONAL_ASSESSMENT: 0-10

## 2022-10-16 NOTE — Clinical Note
Nikky Bashir was seen and treated in our emergency department on 10/16/2022. He may return to work on 10/19/2022. If you have any questions or concerns, please don't hesitate to call.       Kristina Hylton MD

## 2022-10-17 PROCEDURE — 93005 ELECTROCARDIOGRAM TRACING: CPT

## 2022-10-17 RX ORDER — IBUPROFEN 800 MG/1
800 TABLET ORAL EVERY 8 HOURS PRN
Qty: 15 TABLET | Refills: 0 | Status: SHIPPED | OUTPATIENT
Start: 2022-10-17 | End: 2022-10-30

## 2022-10-17 RX ORDER — LIDOCAINE 50 MG/G
1 PATCH TOPICAL DAILY
Qty: 10 PATCH | Refills: 0 | Status: SHIPPED | OUTPATIENT
Start: 2022-10-17 | End: 2022-10-27

## 2022-10-17 ASSESSMENT — ENCOUNTER SYMPTOMS
ABDOMINAL PAIN: 0
SHORTNESS OF BREATH: 0
CHEST TIGHTNESS: 0
BACK PAIN: 1
COUGH: 0
TROUBLE SWALLOWING: 0
WHEEZING: 0
CONSTIPATION: 0
ABDOMINAL DISTENTION: 0
NAUSEA: 0
VOICE CHANGE: 0
VOMITING: 0
SORE THROAT: 0
DIARRHEA: 0
RHINORRHEA: 0

## 2022-10-17 NOTE — DISCHARGE INSTRUCTIONS
You are seen in the emergency department for your shoulder pain. We examined you and got x-rays of your chest and shoulder. There are no fractures or acute injuries at this time. Your pain is likely secondary to a muscle spasm. We have provided you prescriptions for lidocaine patches and Motrin. Please place 1 patch on the skin daily for 12 hours on and 12 hours off. Please take 1 tablet of Motrin every 8 hours as needed for pain. Please return the emergency room if develop chest pain, shortness of breath, abdominal pain, nausea, vomiting, diarrhea, worsening back pain, or any other concerns. Please call the family practice clinic schedule appoint with the next 24 to 48 hours for follow-up.

## 2022-10-17 NOTE — ED PROVIDER NOTES
[facility] [address] [facility]     Emergency Department     Faculty Attestation        I performed a history and physical examination of the patient and discussed management with the resident. I reviewed the residents note and agree with the documented findings and plan of care. Any areas of disagreement are noted on the chart. I was personally present for the key portions of any procedures. I have documented in the chart those procedures where I was not present during the key portions. I have reviewed the emergency nurses triage note. I agree with the chief complaint, past medical history, past surgical history, allergies, medications, social and family history as documented unless otherwise noted below. For Physician Assistant/ Nurse Practitioner cases/documentation I have personally evaluated this patient and have completed at least one if not all key elements of the E/M (history, physical exam, and MDM). Additional findings are as noted. Vital Signs: BP: 138/83  Heart Rate: 83  Resp: 20  Temp: 97.7 °F (36.5 °C) SpO2: 98 %  PCP:  No primary care provider on file. Pertinent Comments:     Patient is a 75-year-old male [employer] speaker only who had interview conducted through  in the room. Patient has described off and on for the last few days but today specifically having pain in his right shoulder blade that is reproduced by movement of the arm and shoulder specifically. Occasionally has some pain with deep breath but it is only when he feels it moves his shoulder blade. Denies any actual shortness of breath associated and no chest pain. There is been no vomiting or diarrhea or other illnesses and no abdominal pain. He feels hot shower does help here with loosening of the musculature and significantly improves the discomfort if not completely leaves it.    On exam patient is neurovascular intact in that extremity with good distal strength and pulses but does have pain to palpation of the inferior border of the scapula as well as on movement with his arm going above his head that exquisitely reproduces the pain. There are no rashes seen in the area at all  Assessment/plan: Patient with what appears to be musculoskeletal pain will obtain x-ray of the shoulder as well as chest x-ray. Muscle relaxant and NSAID. Reevaluate after        EKG Interpretation    Interpreted by emergency department physician    Rhythm: normal sinus   Rate: normal at 70 bpm  Axis: normal  Conduction: Borderline incomplete right bundle branch block  ST Segments: no acute change  T Waves: no acute change  Q Waves: no acute change    Clinical Impression:  nonspecific EKG and appears unchanged from previous EKG on 6/1/2022 when compared      Critical Care  None      (Please note that portions of this note were completed with a voice recognition program. Efforts were made to edit the dictations but occasionally words are mis-transcribed.  Whenever words are used in this note in any gender, they shall be construed as though they were used in the gender appropriate to the circumstances; and whenever words are used in this note in the singular or plural form, they shall be construed as though they were used in the form appropriate to the circumstances.)    MD Jackson Mcghee  Attending Emergency Medicine Physician            Janet Leal MD  10/16/22 [id number]       Janet Leal MD  10/17/22 4973

## 2022-10-17 NOTE — ED PROVIDER NOTES
[facility] ED  Emergency Department Encounter  Emergency Medicine Resident     Pt Carlos Lerma  MRN: [id number]  [place] 1975  Date of evaluation: 10/16/22  PCP:  No primary care provider on file. CHIEF COMPLAINT       Chief Complaint   Patient presents with    Back Pain     States hurts with breathing       HISTORY OF PRESENT ILLNESS  (Location/Symptom, Timing/Onset, Context/Setting, Quality, Duration, Modifying Factors, Severity.)      Morgan Louise is a 50 y.o. male who presents with right-sided back pain. Patient states that the pain began today after he was working on his job as a . Patient states the pain is worse with movement of his right shoulder, deep inspiration, and any other movement. Patient cannot say whether or not the pain is sharp or dull however he states it is located directly underneath his right scapula. Patient denies the pain rating anywhere. He denies chest pain, shortness of breath, abdominal pain, nausea, vomiting, or any other medical complaints at this time. Patient states that he believes this is a muscle spasm caused by his work. Patient is primarily [place]-speaking and a virtual  was used for the same patient counter. PAST MEDICAL / SURGICAL / SOCIAL / FAMILY HISTORY      has a past medical history of Arthritis, Asthma, and Depression. has a past surgical history that includes meniscectomy; Finger fracture surgery (Left, 03/31/2022); Finger Closed Reduction (Left, 03/31/2022); and Finger surgery (Left, 3/31/2022).       Social History     Socioeconomic History    Marital status: Single     Spouse name: Not on file    Number of children: Not on file    Years of education: Not on file    Highest education level: Not on file   Occupational History    Not on file   Tobacco Use    Smoking status: Every Day     Packs/day: 0.00     Years: 32.00     Pack years: 0.00     Types: E-Cigarettes, Cigarettes Smokeless tobacco: Never   Substance and Sexual Activity    Alcohol use: Never    Drug use: Never    Sexual activity: Not on file   Other Topics Concern    Not on file   Social History Narrative    Not on file     Social Determinants of Health     Financial Resource Strain: Not on file   Food Insecurity: Not on file   Transportation Needs: Not on file   Physical Activity: Not on file   Stress: Not on file   Social Connections: Not on file   Intimate Partner Violence: Not on file   Housing Stability: Not on file       No family history on file. Allergies:  Shellfish-derived products and Food    Home Medications:  Prior to Admission medications    Medication Sig Start Date End Date Taking? Authorizing Provider   lidocaine (LIDODERM) 5 % Place 1 patch onto the skin daily for 10 days 12 hours on, 12 hours off. 10/17/22 10/27/22 Yes Jeanine Lombard, MD   ibuprofen (IBU) 800 MG tablet Take 1 tablet by mouth every 8 hours as needed for Pain 10/17/22  Yes Jeanine Lombard, MD   albuterol sulfate HFA (PROVENTIL HFA) 108 (90 Base) MCG/ACT inhaler Inhale 1-2 puffs into the lungs every 4 hours as needed for Wheezing or Shortness of Breath (Space out to every 6 hours as symptoms improve) Space out to every 6 hours as symptoms improve. 9/23/22   Jeanine Lombard, MD   predniSONE (DELTASONE) 50 MG tablet Take 1 tablet by mouth daily 9/18/22   Isaac Brady MD   acetaminophen (TYLENOL) 500 MG tablet Take 1 tablet by mouth every 6 hours as needed for Pain 8/13/22   Piotr Sloan MD   ibuprofen (ADVIL;MOTRIN) 600 MG tablet Take 1 tablet by mouth every 6 hours as needed for Pain 8/13/22   Piotr Sloan MD   acetaminophen (TYLENOL) 500 MG tablet Take 1 tablet by mouth every 6 hours as needed for Pain 6/8/22   Billie Correa,        REVIEW OF SYSTEMS    (2-9 systems for level 4, 10 or more for level 5)      Review of Systems   Constitutional:  Negative for chills and fever.    HENT:  Negative for congestion, rhinorrhea, sore throat, trouble swallowing and voice change. Respiratory:  Negative for cough, chest tightness, shortness of breath and wheezing. Cardiovascular:  Negative for chest pain and palpitations. Gastrointestinal:  Negative for abdominal distention, abdominal pain, constipation, diarrhea, nausea and vomiting. Genitourinary:  Negative for flank pain. Musculoskeletal:  Positive for arthralgias and back pain. Negative for myalgias and neck pain. Right shoulder pain and back pain   Skin:  Negative for wound. Neurological:  Negative for dizziness, syncope, weakness, light-headedness, numbness and headaches. Psychiatric/Behavioral:  Negative for agitation and confusion. PHYSICAL EXAM   (up to 7 for level 4, 8 or more for level 5)      INITIAL VITALS:   /83   Pulse 83   Temp 97.7 °F (36.5 °C) (Oral)   Resp 20   Ht 6' 2\" (1.88 m)   Wt 202 lb 12.8 oz (92 kg)   SpO2 98%   BMI 26.04 kg/m²     Physical Exam  Constitutional:       Appearance: Normal appearance. He is normal weight. HENT:      Head: Normocephalic and atraumatic. Nose: Nose normal.      Mouth/Throat:      Mouth: Mucous membranes are moist.      Pharynx: Oropharynx is clear. Eyes:      Extraocular Movements: Extraocular movements intact. Conjunctiva/sclera: Conjunctivae normal.      Pupils: Pupils are equal, round, and reactive to light. Cardiovascular:      Rate and Rhythm: Normal rate and regular rhythm. Pulses: Normal pulses. Heart sounds: Normal heart sounds. Pulmonary:      Effort: Pulmonary effort is normal.      Breath sounds: Normal breath sounds. Abdominal:      General: Abdomen is flat. Palpations: Abdomen is soft. Musculoskeletal:         General: Normal range of motion. Skin:     General: Skin is warm and dry. Capillary Refill: Capillary refill takes less than 2 seconds. Neurological:      General: No focal deficit present.       Mental Status: He is alert and oriented to person, place, and time. Mental status is at baseline. Psychiatric:         Behavior: Behavior normal.       DIFFERENTIAL  DIAGNOSIS     PLAN (LABS / IMAGING / EKG):  Orders Placed This Encounter   Procedures    XR SHOULDER RIGHT (MIN 2 VIEWS)    XR CHEST PORTABLE    EKG 12 Lead       MEDICATIONS ORDERED:  Orders Placed This Encounter   Medications    DISCONTD: ketorolac (TORADOL) injection 30 mg    lidocaine 4 % external patch 1 patch    ketorolac (TORADOL) injection 30 mg    lidocaine (LIDODERM) 5 %     Sig: Place 1 patch onto the skin daily for 10 days 12 hours on, 12 hours off. Dispense:  10 patch     Refill:  0    ibuprofen (IBU) 800 MG tablet     Sig: Take 1 tablet by mouth every 8 hours as needed for Pain     Dispense:  15 tablet     Refill:  0       DDX: Right shoulder strain, musculoskeletal pain    MDM: Patient is a 38-year-old male with history of COPD who presents with right shoulder pain. Patient is GCS 15, nontoxic-appearing, nonacute distress, speaking full sentences, able to ambulate under his own power. Patient is not tachycardic, afebrile, normotensive, satting 98% on room air. Patient has reproducible right back pain to palpation just inferior to the scapular border. Patient has limited range of motion of the shoulder caused by pain, abduction of the shoulder reproduces the pain exactly how patient describes. Given mechanism of injury from working as a  and reproducibility of the pain, likely a right shoulder strain of the latissimus dorsi muscle. Plan on treating with Toradol, lidocaine patch, return precautions, follow-up with PCP. Obtain screening EKG due to patient's age. DIAGNOSTIC RESULTS / EMERGENCY DEPARTMENT COURSE / MDM   LAB RESULTS:  No results found for this visit on 10/16/22. RADIOLOGY:  XR SHOULDER RIGHT (MIN 2 VIEWS)   Final Result   No acute abnormality visualized. XR CHEST PORTABLE   Final Result   No acute abnormality visualized. EKG  EKG INTERPRETATION:    Interpreted by me    Rhythm: normal sinus   Rate: 70  Axis: normal  Ectopy: none  Conduction: right bundle branch block (complete)  Intervals: 384/414, 152  ST Segments: no acute change  T Waves: no acute change  Q Waves: none    EKG  Impression: no acute changes      All EKG's are interpreted by the Emergency Department Physician who either signs or Co-signs this chart in the absence of a cardiologist.    EMERGENCY DEPARTMENT COURSE:      ED Course as of 10/17/22 0024   Mon Oct 17, 2022   0021 XR SHOULDER RIGHT (MIN 2 VIEWS) [AK]   0021 XR CHEST PORTABLE  Negative images of the chest and sure. Patient likely has musculoskeletal pain plan on discharge home with return precautions, follow-up with PCP, lidocaine patches, Motrin. [AK]      ED Course User Index  [AK] Blaze Torres MD       No notes of EC Admission Criteria type on file. PROCEDURES:  None    CONSULTS:  None    CRITICAL CARE:  None      FINAL IMPRESSION      1. Acute right-sided back pain, unspecified back location          DISPOSITION / PLAN     DISPOSITION Decision To Discharge 10/17/2022 12:21:35 AM      PATIENT REFERRED TO:  [address] [place], [place] [id number] [phone]  Schedule an appointment as soon as possible for a visit in 1 day      DISCHARGE MEDICATIONS:  New Prescriptions    IBUPROFEN (IBU) 800 MG TABLET    Take 1 tablet by mouth every 8 hours as needed for Pain    LIDOCAINE (LIDODERM) 5 %    Place 1 patch onto the skin daily for 10 days 12 hours on, 12 hours off.        Blaze Torres MD  Emergency Medicine Resident    (Please note that portions of thisnote were completed with a voice recognition program.  Efforts were made to edit the dictations but occasionally words are mis-transcribed.)        Blaze Torres MD  Resident  10/17/22 [id number]

## 2022-10-17 NOTE — ED TRIAGE NOTES
Pt ambulatory to ED 28 from triage. Pt is a/o x4. Pt is Occitan speaking only,  at bedside. Pt stated that this morning he woke up fine and went to the store. Pt stated that once he was at work he began to have R scapula pain upon movement. Pt vitals assessed and noted. NAD noted. Dr. Carla Castro and Dr. Mello Memory at bedside.

## 2022-10-18 LAB
EKG ATRIAL RATE: 70 BPM
EKG P AXIS: 38 DEGREES
EKG P-R INTERVAL: 152 MS
EKG Q-T INTERVAL: 384 MS
EKG QRS DURATION: 100 MS
EKG QTC CALCULATION (BAZETT): 414 MS
EKG R AXIS: 71 DEGREES
EKG T AXIS: 48 DEGREES
EKG VENTRICULAR RATE: 70 BPM

## 2022-10-23 ENCOUNTER — HOSPITAL ENCOUNTER (EMERGENCY)
Age: 47
Discharge: HOME | End: 2022-10-24
Attending: EMERGENCY MEDICINE
Payer: COMMERCIAL

## 2022-10-23 VITALS
SYSTOLIC BLOOD PRESSURE: 117 MMHG | TEMPERATURE: 97 F | DIASTOLIC BLOOD PRESSURE: 72 MMHG | RESPIRATION RATE: 20 BRPM | HEIGHT: 74 IN | HEART RATE: 84 BPM | OXYGEN SATURATION: 98 % | WEIGHT: 200 LBS | BODY MASS INDEX: 25.67 KG/M2

## 2022-10-23 DIAGNOSIS — S90.32XA CONTUSION OF LEFT FOOT, INITIAL ENCOUNTER: ICD-10-CM

## 2022-10-23 DIAGNOSIS — L73.9 FOLLICULITIS: Primary | ICD-10-CM

## 2022-10-23 PROCEDURE — 99283 EMERGENCY DEPT VISIT LOW MDM: CPT

## 2022-10-23 RX ORDER — CEPHALEXIN 500 MG/1
500 CAPSULE ORAL ONCE
Status: COMPLETED | OUTPATIENT
Start: 2022-10-24 | End: 2022-10-24

## 2022-10-23 RX ORDER — ACETAMINOPHEN 500 MG
1000 TABLET ORAL ONCE
Status: COMPLETED | OUTPATIENT
Start: 2022-10-24 | End: 2022-10-24

## 2022-10-23 ASSESSMENT — PAIN SCALES - GENERAL: PAINLEVEL_OUTOF10: 6

## 2022-10-23 ASSESSMENT — PAIN DESCRIPTION - LOCATION: LOCATION: FOOT

## 2022-10-23 ASSESSMENT — PAIN DESCRIPTION - ORIENTATION: ORIENTATION: LEFT

## 2022-10-23 ASSESSMENT — PAIN - FUNCTIONAL ASSESSMENT: PAIN_FUNCTIONAL_ASSESSMENT: 0-10

## 2022-10-23 NOTE — Clinical Note
Adriane Nielsen was seen and treated in our emergency department on 10/23/2022. He may return to work on 10/24/2022. Patient was seen and evaluated. Has an ingrown hair in his armpit, okay to take antibiotics for the next 7 days. Able to return to work without any work restrictions. If you have any questions or concerns, please don't hesitate to call.       Nixon Pa, DO

## 2022-10-24 ENCOUNTER — APPOINTMENT (OUTPATIENT)
Dept: GENERAL RADIOLOGY | Age: 47
End: 2022-10-24
Payer: COMMERCIAL

## 2022-10-24 PROCEDURE — 6370000000 HC RX 637 (ALT 250 FOR IP): Performed by: STUDENT IN AN ORGANIZED HEALTH CARE EDUCATION/TRAINING PROGRAM

## 2022-10-24 PROCEDURE — 73630 X-RAY EXAM OF FOOT: CPT

## 2022-10-24 RX ORDER — CEPHALEXIN 500 MG/1
500 CAPSULE ORAL 2 TIMES DAILY
Qty: 14 CAPSULE | Refills: 0 | Status: SHIPPED | OUTPATIENT
Start: 2022-10-24 | End: 2022-10-31

## 2022-10-24 RX ORDER — ACETAMINOPHEN 500 MG
500 TABLET ORAL 4 TIMES DAILY PRN
Qty: 360 TABLET | Refills: 0 | Status: SHIPPED | OUTPATIENT
Start: 2022-10-24 | End: 2022-10-30

## 2022-10-24 RX ADMIN — CEPHALEXIN 500 MG: 500 CAPSULE ORAL at 00:33

## 2022-10-24 RX ADMIN — ACETAMINOPHEN 1000 MG: 500 TABLET ORAL at 00:33

## 2022-10-24 ASSESSMENT — ENCOUNTER SYMPTOMS
CONSTIPATION: 0
TROUBLE SWALLOWING: 0
DIARRHEA: 0
COLOR CHANGE: 0
SINUS PAIN: 0
SORE THROAT: 0
SINUS PRESSURE: 0
ABDOMINAL PAIN: 0
WHEEZING: 0
SHORTNESS OF BREATH: 0
NAUSEA: 0
VOMITING: 0
CHEST TIGHTNESS: 0
COUGH: 0
FACIAL SWELLING: 0

## 2022-10-24 NOTE — ED NOTES
Pt discharged home at this time. Pt verbalized understanding and denies questions at this time.       Zaynab Daily RN  10/24/22 [id number]

## 2022-10-24 NOTE — ED NOTES
Information obtained via video :  Patient presents to ED c/o red swollen bump under his right arm pit and pain to the left ankle/foot. The patient states that he has a history of these red bumps since he was little.       Khalif Naranjo RN  54/10/33 [id number]

## 2022-10-24 NOTE — ED PROVIDER NOTES
[address]  ED  eMERGENCY dEPARTMENT eNCOUnter   Attending Attestation     Pt Name: Rashid Orosco  MRN: [id number]  [place] 1975  Date of evaluation: 10/23/22       Rashid Orosco is a 50 y.o. male who presents with Arm Pain (right) and Ankle Pain (Left )      History: Patient presents with right armpit pain with small abscess. Patient also has left foot pain secondary to a cutting board falling on it. Exam: Patient has small abscess in the right axilla. It is approximately 1 cm. It is coming to ahead. Patient also has tenderness over the left foot. Pulses are present. Plan for warm compresses and antibiotics for the abscess in the axilla, will get an x-ray of the foot, will treat symptomatically and discharge. I performed a history and physical examination of the patient and discussed management with the resident. I reviewed the residents note and agree with the documented findings and plan of care. Any areas of disagreement are noted on the chart. I was personally present for the key portions of any procedures. I have documented in the chart those procedures where I was not present during the key portions. I have personally reviewed all images and agree with the resident's interpretation. I have reviewed the emergency nurses triage note. I agree with the chief complaint, past medical history, past surgical history, allergies, medications, social and family history as documented unless otherwise noted below. Documentation of the HPI, Physical Exam and Medical Decision Making performed by medical students or scribes is based on my personal performance of the HPI, PE and MDM. For Phys Assistant/ Nurse Practitioner cases/documentation I have had a face to face evaluation of this patient and have completed at least one if not all key elements of the E/M (history, physical exam, and MDM). Additional findings are as noted.     For APC cases I have personally evaluated and examined the patient in conjunction with the APC and agree with the treatment plan and disposition of the patient as recorded by the APC.     Rosa Elena Wiley MD  Attending Emergency  Physician        Aminta Alan MD  10/23/22 1616

## 2022-10-24 NOTE — ED PROVIDER NOTES
[facility]  ED  Emergency Department Encounter  Emergency Medicine Resident     Pt Name: Nikky Bashir  MRN: [id number]  Azragfkeisha 1975  Date of evaluation: 10/23/22  PCP:  No primary care provider on file. CHIEF COMPLAINT       Chief Complaint   Patient presents with    Arm Pain     right    Ankle Pain     Left        HISTORY OFPRESENT ILLNESS  (Location/Symptom, Timing/Onset, Context/Setting, Quality, Duration, Modifying Factors,Severity.)      Nikky Bashir is a 50 y. o.yo male who presents with complaints of left ankle/foot pain as well as right armpit pain. Patient states he was at work when he dropped a wooden cutting board on the top of his foot since then has been having pain. He is able to ambulate without difficulty. Patient also complaining of right pain in his armpit. Patient states he gets these bumps that are painful and full of pus in his armpit as well as his buttock and groin region. Patient states he only has 1 in his armpit at this time. Denies any fever chills nausea or vomiting. He does state that they are very painful and tender to the touch. PAST MEDICAL / SURGICAL / SOCIAL / FAMILY HISTORY      has a past medical history of Arthritis, Asthma, and Depression. has a past surgical history that includes meniscectomy; Finger fracture surgery (Left, 03/31/2022); Finger Closed Reduction (Left, 03/31/2022); and Finger surgery (Left, 3/31/2022).      Social History     Socioeconomic History    Marital status: Single     Spouse name: Not on file    Number of children: Not on file    Years of education: Not on file    Highest education level: Not on file   Occupational History    Not on file   Tobacco Use    Smoking status: Every Day     Packs/day: 0.00     Years: 32.00     Pack years: 0.00     Types: E-Cigarettes, Cigarettes    Smokeless tobacco: Never   Substance and Sexual Activity    Alcohol use: Never    Drug use: Never    Sexual activity: Not on file   Other Topics Concern    Not on file   Social History Narrative    Not on file     Social Determinants of Health     Financial Resource Strain: Not on file   Food Insecurity: Not on file   Transportation Needs: Not on file   Physical Activity: Not on file   Stress: Not on file   Social Connections: Not on file   Intimate Partner Violence: Not on file   Housing Stability: Not on file       No family history on file. Allergies:  Shellfish-derived products and Food    Home Medications:  Prior to Admission medications    Medication Sig Start Date End Date Taking? Authorizing Provider   cephALEXin (KEFLEX) 500 MG capsule Take 1 capsule by mouth 2 times daily for 7 days 10/24/22 10/31/22 Yes José Jose,    acetaminophen (TYLENOL) 500 MG tablet Take 1 tablet by mouth 4 times daily as needed for Pain 10/24/22  Yes José Jose DO   lidocaine (LIDODERM) 5 % Place 1 patch onto the skin daily for 10 days 12 hours on, 12 hours off. 10/17/22 10/27/22  Ira Blackmon MD   ibuprofen (IBU) 800 MG tablet Take 1 tablet by mouth every 8 hours as needed for Pain 10/17/22   Ira Blackmon MD   albuterol sulfate HFA (PROVENTIL HFA) 108 (90 Base) MCG/ACT inhaler Inhale 1-2 puffs into the lungs every 4 hours as needed for Wheezing or Shortness of Breath (Space out to every 6 hours as symptoms improve) Space out to every 6 hours as symptoms improve. 9/23/22   Ira Blackmon MD   predniSONE (DELTASONE) 50 MG tablet Take 1 tablet by mouth daily 9/18/22   Eufemia Ryan MD   ibuprofen (ADVIL;MOTRIN) 600 MG tablet Take 1 tablet by mouth every 6 hours as needed for Pain 8/13/22   Justin Sutherland MD       REVIEW OFSYSTEMS    (2-9 systems for level 4, 10 or more for level 5)      Review of Systems   Constitutional:  Negative for chills, diaphoresis, fatigue and fever. HENT:  Negative for facial swelling, nosebleeds, sinus pressure, sinus pain, sore throat and trouble swallowing.     Respiratory:  Negative for cough, chest tightness, shortness of breath and wheezing. Cardiovascular:  Negative for chest pain, palpitations and leg swelling. Gastrointestinal:  Negative for abdominal pain, constipation, diarrhea, nausea and vomiting. Genitourinary:  Negative for dysuria, flank pain and hematuria. Musculoskeletal:  Positive for arthralgias and joint swelling. Negative for gait problem, neck pain and neck stiffness. Skin:  Positive for wound. Negative for color change and rash. Neurological:  Negative for dizziness, syncope, weakness, light-headedness and headaches. PHYSICAL EXAM   (up to 7 for level 4, 8 or more forlevel 5)      INITIAL VITALS:   ED Triage Vitals [10/23/22 2320]   BP Temp Temp Source Heart Rate Resp SpO2 Height Weight   117/72 97 °F (36.1 °C) Oral 84 20 98 % -- --       Physical Exam  Constitutional:       General: He is not in acute distress. Appearance: He is normal weight. He is not ill-appearing. HENT:      Head: Normocephalic and atraumatic. Right Ear: External ear normal.      Left Ear: External ear normal.      Nose: Nose normal.   Eyes:      General: No scleral icterus. Extraocular Movements: Extraocular movements intact. Conjunctiva/sclera: Conjunctivae normal.      Pupils: Pupils are equal, round, and reactive to light. Cardiovascular:      Rate and Rhythm: Normal rate and regular rhythm. Pulses: Normal pulses. Heart sounds: Normal heart sounds. Pulmonary:      Effort: Pulmonary effort is normal. No respiratory distress. Breath sounds: Normal breath sounds. Abdominal:      General: Abdomen is flat. Bowel sounds are normal. There is no distension. Palpations: Abdomen is soft. Tenderness: There is no abdominal tenderness. Musculoskeletal:         General: Swelling and tenderness present. Normal range of motion. Cervical back: Normal range of motion. No muscular tenderness.       Comments: Tenderness to palpation over the top of the left foot, mild erythema, no ecchymosis   Skin:     General: Skin is warm and dry. Findings: Lesion present. Comments: 1 cm fluctuant abscess noted in the right axilla, mildly erythematous, no streaking, no lymphadenopathy   Neurological:      General: No focal deficit present. Mental Status: He is alert and oriented to person, place, and time. Cranial Nerves: No cranial nerve deficit. DIFFERENTIAL  DIAGNOSIS     PLAN (LABS / IMAGING / EKG):  Orders Placed This Encounter   Procedures    XR FOOT LEFT (MIN 3 VIEWS)       MEDICATIONS ORDERED:  Orders Placed This Encounter   Medications    cephALEXin (KEFLEX) capsule 500 mg     Order Specific Question:   Antimicrobial Indications     Answer:   Skin and Soft Tissue Infection    acetaminophen (TYLENOL) tablet 1,000 mg    cephALEXin (KEFLEX) 500 MG capsule     Sig: Take 1 capsule by mouth 2 times daily for 7 days     Dispense:  14 capsule     Refill:  0    acetaminophen (TYLENOL) 500 MG tablet     Sig: Take 1 tablet by mouth 4 times daily as needed for Pain     Dispense:  360 tablet     Refill:  0       DDX: foot sprain, foot fracture, hidradenitis, folliculitis, abscess    Initial MDM/Plan: 50 y.o. male who presents with left foot pain after a cutting board fell on his foot as well as his right axillary pain. Patient has an abscess in his right axilla. I would not incise this at this time. We will start patient on antibiotics and recommend warm compresses. We will get x-ray of the left foot. DIAGNOSTIC RESULTS / EMERGENCYDEPARTMENT COURSE / MDM     LABS:  Labs Reviewed - No data to display      RADIOLOGY:  XR FOOT LEFT (MIN 3 VIEWS)    Result Date: 10/24/2022  EXAMINATION: THREE XRAY VIEWS OF THE LEFT FOOT 10/24/2022 12:34 am COMPARISON: None.  HISTORY: ORDERING SYSTEM PROVIDED HISTORY: wood fell on foot TECHNOLOGIST PROVIDED HISTORY: wood fell on foot Reason for Exam: foot pain, dropped wood on foot FINDINGS: No fracture is identified within the foot.  No osseous destructive process is seen. No radiopaque foreign body is identified. Mild degenerative changes seen at the ankle. No acute traumatic injury is identified. EKG      All EKG's are interpreted by the Emergency Department Physicianwho either signs or Co-signs this chart in the absence of a cardiologist.    EMERGENCY DEPARTMENT COURSE:  ED Course as of 10/24/22 0146   Mon Oct 24, 2022   0004 Patient seen and evaluated using video . He is Icelandic-speaking. Complaining of left foot pain after dropping a wooden cutting board on his foot at work. Also has an abscess in his right armpit. [CD]   0104 Xray negative, will d/c home with Abx  [CD]      ED Course User Index  [CD] Vanita Ward DO          PROCEDURES:  None    CONSULTS:  None    CRITICAL CARE:      FINAL IMPRESSION      1. Folliculitis    2.  Contusion of left foot, initial encounter          DISPOSITION / PLAN     DISPOSITION Decision To Discharge 10/24/2022 01:04:44 AM      PATIENT REFERRED TO:  [address] [place] [address] Road  [address] [place] [id number] [phone]  Schedule an appointment as soon as possible for a visit in 3 days  For ER follow-up    [facility] ED  [address] [address]  [phone]  Go to   If symptoms worsen    DISCHARGE MEDICATIONS:  Discharge Medication List as of 10/24/2022  1:08 AM        START taking these medications    Details   cephALEXin (KEFLEX) 500 MG capsule Take 1 capsule by mouth 2 times daily for 7 days, Disp-14 capsule, R-0Print             Vanita Ward DO  Emergency Medicine Resident    (Please note that portions of this note were completed with a voice recognition program.Efforts were made to edit the dictations but occasionally words are mis-transcribed.)       Vanita Ward DO  Resident  10/24/22 1137

## 2022-10-24 NOTE — ED PROVIDER NOTES
Faculty Sign-Out Attestation  Handoff taken on the following patient from prior Attending Physician: Otila Sandhoff    I was available and discussed any additional care issues that arose and coordinated the management plans with the resident(s) caring for the patient during my duty period. Any areas of disagreement with residents documentation of care or procedures are noted on the chart. I was personally present for the key portions of any/all procedures during my duty period. I have documented in the chart those procedures where I was not present during the key portions.     Foot pain, L foot xr pending,   Treating superficial cutaneous abscess / abx,   Will discharge    Herlinda Horvath DO  Attending Physician      Herlinda Horvath, DO  10/24/22 0004    XR L foot - , will discharge per plan     Herlinda Horvath, DO  10/24/22 Uri 47, DO  10/24/22 4463

## 2022-10-29 ENCOUNTER — HOSPITAL ENCOUNTER (EMERGENCY)
Age: 47
Discharge: HOME | End: 2022-10-30
Attending: EMERGENCY MEDICINE
Payer: COMMERCIAL

## 2022-10-29 VITALS
SYSTOLIC BLOOD PRESSURE: 118 MMHG | HEART RATE: 76 BPM | TEMPERATURE: 97.7 F | DIASTOLIC BLOOD PRESSURE: 72 MMHG | OXYGEN SATURATION: 98 % | RESPIRATION RATE: 19 BRPM

## 2022-10-29 DIAGNOSIS — M25.562 LEFT KNEE PAIN, UNSPECIFIED CHRONICITY: Primary | ICD-10-CM

## 2022-10-29 PROCEDURE — 99283 EMERGENCY DEPT VISIT LOW MDM: CPT

## 2022-10-30 ENCOUNTER — APPOINTMENT (OUTPATIENT)
Dept: GENERAL RADIOLOGY | Age: 47
End: 2022-10-30
Payer: COMMERCIAL

## 2022-10-30 PROCEDURE — 73562 X-RAY EXAM OF KNEE 3: CPT

## 2022-10-30 RX ORDER — LIDOCAINE 4 G/G
1 PATCH TOPICAL DAILY
Qty: 5 PATCH | Refills: 0 | Status: SHIPPED | OUTPATIENT
Start: 2022-10-30 | End: 2022-11-29

## 2022-10-30 RX ORDER — ACETAMINOPHEN 325 MG/1
325 TABLET ORAL EVERY 6 HOURS PRN
Qty: 30 TABLET | Refills: 0 | Status: SHIPPED | OUTPATIENT
Start: 2022-10-30

## 2022-10-30 RX ORDER — NAPROXEN 500 MG/1
500 TABLET ORAL 2 TIMES DAILY WITH MEALS
Qty: 10 TABLET | Refills: 0 | Status: SHIPPED | OUTPATIENT
Start: 2022-10-30

## 2022-10-30 NOTE — ED NOTES
Patient referred to [address] and Sports Medicine Tata Lowe 1615 10. Pt also referred to 211 for community resources. Pt reports secure housing on [facility] and reports having Section 8 housing support. Syriac Interpretor used.       LUIS Rahman  10/30/22 0101

## 2022-10-30 NOTE — ED NOTES
Pt arrived to ED49 ambulatory with complaints of left knee pain since this morning. Pt is alert, responsive and coherent. Pt states that he took Tylenol and this day before coming to ED. Vital signs taken and recorded. Will continue to monitor.      Richie Waldrop RN  10/29/22 1742

## 2022-10-30 NOTE — ED PROVIDER NOTES
[facility]     Emergency Department     Faculty Attestation    I performed a history and physical examination of the patient and discussed management with the resident. I reviewed the residents note and agree with the documented findings and plan of care. Any areas of disagreement are noted on the chart. I was personally present for the key portions of any procedures. I have documented in the chart those procedures where I was not present during the key portions. I have reviewed the emergency nurses triage note. I agree with the chief complaint, past medical history, past surgical history, allergies, medications, social and family history as documented unless otherwise noted below. For Physician Assistant/ Nurse Practitioner cases/documentation I have personally evaluated this patient and have completed at least one if not all key elements of the E/M (history, physical exam, and MDM). Additional findings are as noted. I have personally seen and evaluated the patient. I find the patient's history and physical exam are consistent with the NP/PA documentation. I agree with the care provided, treatment rendered, disposition and follow-up plan. Patient primarily Setswana-speaking,  used for history and physical examination    77-year-old male presenting for atraumatic left knee pain. Pain worsens with standing, and he noted that it swelled up at work today. He has had some leg pain ongoing, but not knee pain like this. It gets better when he rubs the back of his leg. He has had a cyst drained on his knee before (states it was a fatty mass -I assume this was a lipoma from his description)    Exam:  General : Laying on the bed, awake, alert, and in no acute distress  MSK: No erythema or warmth over the left knee. Mild pain with Lachman's test, however no joint instability. Pulses intact. Strength and sensation intact.     Plan:  X-ray to rule out bony abnormalities  Pain control -patient already took Tylenol and ibuprofen prior to arrival  Follow-up with orthopedics    Tiara Ferguson MD   Attending Emergency Physician    (Please note that portions of this note were completed with a voice recognition program. Efforts were made to edit the dictations but occasionally words are mis-transcribed.)            Tiara Ferguson MD  10/30/22 1983

## 2022-10-30 NOTE — ED PROVIDER NOTES
[address] ED  Emergency Department Encounter  Emergency Medicine Resident     Colby Torrez  MRN: [id number]  Azragfkeisha 1975  Date of evaluation: 10/30/22  PCP:  No primary care provider on file. CHIEF COMPLAINT       Chief Complaint   Patient presents with    Knee Pain     Left knee       HISTORY OF PRESENT ILLNESS  (Location/Symptom, Timing/Onset, Context/Setting, Quality, Duration, Modifying Factors, Severity.)      Rashaad Belle is a 50 y.o. male presenting for assessment of left-sided knee pain. Patient states that he has struggled with this pain for long period of time but acutely got worse today. It occurred while he was at work. There is no overt injury or event that he can recall but he states that as he stands on it for longer periods of time his pain tends to worsen. He states that he had a cyst removed from the knee in the past, however there is a meniscectomy listed in his chart with no specification of laterality. Patient states that he took some Tylenol earlier today as well as 1400 mg of Motrin with limited relief of symptoms. PAST MEDICAL / SURGICAL / SOCIAL / FAMILY HISTORY      has a past medical history of Arthritis, Asthma, and Depression. has a past surgical history that includes meniscectomy; Finger fracture surgery (Left, 03/31/2022); Finger Closed Reduction (Left, 03/31/2022); and Finger surgery (Left, 3/31/2022).       Social History     Socioeconomic History    Marital status: Single     Spouse name: Not on file    Number of children: Not on file    Years of education: Not on file    Highest education level: Not on file   Occupational History    Not on file   Tobacco Use    Smoking status: Every Day     Packs/day: 0.00     Years: 32.00     Pack years: 0.00     Types: E-Cigarettes, Cigarettes    Smokeless tobacco: Never   Substance and Sexual Activity    Alcohol use: Never    Drug use: Never    Sexual activity: Not on file Other Topics Concern    Not on file   Social History Narrative    Not on file     Social Determinants of Health     Financial Resource Strain: Not on file   Food Insecurity: Not on file   Transportation Needs: Not on file   Physical Activity: Not on file   Stress: Not on file   Social Connections: Not on file   Intimate Partner Violence: Not on file   Housing Stability: Not on file       No family history on file. Allergies:  Shellfish-derived products and Food    Home Medications:  Prior to Admission medications    Medication Sig Start Date End Date Taking? Authorizing Provider   naproxen (NAPROSYN) 500 MG tablet Take 1 tablet by mouth 2 times daily (with meals) 10/30/22  Yes Kiya Virk MD   acetaminophen (TYLENOL) 325 MG tablet Take 1 tablet by mouth every 6 hours as needed for Pain 10/30/22  Yes Kiya Virk MD   lidocaine 4 % external patch Place 1 patch onto the skin daily 10/30/22 11/29/22 Yes Kiya Virk MD   cephALEXin (KEFLEX) 500 MG capsule Take 1 capsule by mouth 2 times daily for 7 days 10/24/22 10/31/22  Bar Milligan DO   albuterol sulfate HFA (PROVENTIL HFA) 108 (90 Base) MCG/ACT inhaler Inhale 1-2 puffs into the lungs every 4 hours as needed for Wheezing or Shortness of Breath (Space out to every 6 hours as symptoms improve) Space out to every 6 hours as symptoms improve. 9/23/22   Fanny Thomas MD   predniSONE (DELTASONE) 50 MG tablet Take 1 tablet by mouth daily 9/18/22   Napoleon Garcia MD   ibuprofen (ADVIL;MOTRIN) 600 MG tablet Take 1 tablet by mouth every 6 hours as needed for Pain 8/13/22   Marleny Wadsworth MD       REVIEW OF SYSTEMS    (2-9 systems for level 4, 10 or more for level 5)      Review of Systems   Constitutional:  Negative for activity change, appetite change, chills and fever. Neurological:  Negative for weakness and numbness.      PHYSICAL EXAM   (up to 7 for level 4, 8 or more for level 5)      INITIAL VITALS:   /72   Pulse 76   Temp 97.7 °F (36.5 °C)   Resp 19   SpO2 98%     Physical Exam  Vitals reviewed. Constitutional:       Appearance: Normal appearance. HENT:      Head: Normocephalic. Nose: Nose normal.      Mouth/Throat:      Mouth: Mucous membranes are moist.   Pulmonary:      Effort: Pulmonary effort is normal.   Musculoskeletal:         General: No tenderness or deformity. Normal range of motion. Cervical back: Normal range of motion. Comments: There is mild effusion surrounding the left knee when compared to the right. There is no erythema or additional warmth. There is normal range of motion of bilateral knees. Valgus, varus, anterior and posterior drawer testing bilaterally is negative. Dorsalis pedis and posterior tibialis pulses are present and equal bilaterally. There is normal sensation distally in bilateral lower extremities. Normal strength to knee flexion and extension, ankle dorsiflexion and plantarflexion. Neurological:      Mental Status: He is alert. DIFFERENTIAL  DIAGNOSIS     PLAN (LABS / IMAGING / EKG):  Orders Placed This Encounter   Procedures    XR KNEE LEFT (3 VIEWS)       MEDICATIONS ORDERED:  Orders Placed This Encounter   Medications    naproxen (NAPROSYN) 500 MG tablet     Sig: Take 1 tablet by mouth 2 times daily (with meals)     Dispense:  10 tablet     Refill:  0    acetaminophen (TYLENOL) 325 MG tablet     Sig: Take 1 tablet by mouth every 6 hours as needed for Pain     Dispense:  30 tablet     Refill:  0    lidocaine 4 % external patch     Sig: Place 1 patch onto the skin daily     Dispense:  5 patch     Refill:  0           DIAGNOSTIC RESULTS / EMERGENCY DEPARTMENT COURSE / MDM   LAB RESULTS:  No results found for this visit on 10/29/22. IMPRESSION: Acute on chronic musculoskeletal knee pain    RADIOLOGY:  XR KNEE LEFT (3 VIEWS)   Final Result   No acute abnormality identified of the left knee.                EKG      All EKG's are interpreted by the Emergency Department Physician who either signs or Co-signs this chart in the absence of a cardiologist.    EMERGENCY DEPARTMENT COURSE:  This patient presents to the emergency department with unremarkable vital signs. There are no findings to suggest an infectious etiology. He has taken Tylenol and an excessive dose of Motrin for his symptoms. These findings appear to be chronic. An x-ray was obtained and unremarkable. The patient will be referred for a family physician/sports medicine physician for routine surveillance and treatment of this pain. He was counseled on appropriate medication dosing for analgesia. He was given prescriptions for Tylenol, naproxen and a lidocaine patch. He will return to the emergency department if any acute worsening of symptoms. No notes of  Admission Criteria type on file. PROCEDURES:      CONSULTS:  None    CRITICAL CARE:      FINAL IMPRESSION      1.  Left knee pain, unspecified chronicity          DISPOSITION / PLAN     DISPOSITION Decision To Discharge 10/30/2022 01:23:35 AM      PATIENT REFERRED TO:  [facility]  [address] [place] [phone]  Schedule an appointment as soon as possible for a visit       DISCHARGE MEDICATIONS:  New Prescriptions    ACETAMINOPHEN (TYLENOL) 325 MG TABLET    Take 1 tablet by mouth every 6 hours as needed for Pain    LIDOCAINE 4 % EXTERNAL PATCH    Place 1 patch onto the skin daily    NAPROXEN (NAPROSYN) 500 MG TABLET    Take 1 tablet by mouth 2 times daily (with meals)       Fletcher Gottron, MD  Emergency Medicine Resident    (Please note that portions of thisnote were completed with a voice recognition program.  Efforts were made to edit the dictations but occasionally words are mis-transcribed.)       Fletcher Gottron, MD  Resident  10/30/22 Dutch Crooks MD  Resident  10/30/22 9615

## 2022-11-27 ENCOUNTER — HOSPITAL ENCOUNTER (EMERGENCY)
Age: 47
Discharge: HOME | End: 2022-11-27
Attending: EMERGENCY MEDICINE
Payer: COMMERCIAL

## 2022-11-27 VITALS
OXYGEN SATURATION: 98 % | WEIGHT: 219 LBS | BODY MASS INDEX: 28.11 KG/M2 | TEMPERATURE: 98.1 F | SYSTOLIC BLOOD PRESSURE: 119 MMHG | DIASTOLIC BLOOD PRESSURE: 82 MMHG | HEIGHT: 74 IN | HEART RATE: 100 BPM | RESPIRATION RATE: 20 BRPM

## 2022-11-27 DIAGNOSIS — M54.42 BILATERAL LOW BACK PAIN WITH LEFT-SIDED SCIATICA, UNSPECIFIED CHRONICITY: Primary | ICD-10-CM

## 2022-11-27 PROCEDURE — 96372 THER/PROPH/DIAG INJ SC/IM: CPT

## 2022-11-27 PROCEDURE — 6360000002 HC RX W HCPCS

## 2022-11-27 PROCEDURE — 99284 EMERGENCY DEPT VISIT MOD MDM: CPT

## 2022-11-27 RX ORDER — KETOROLAC TROMETHAMINE 15 MG/ML
15 INJECTION, SOLUTION INTRAMUSCULAR; INTRAVENOUS ONCE
Status: COMPLETED | OUTPATIENT
Start: 2022-11-27 | End: 2022-11-27

## 2022-11-27 RX ORDER — CYCLOBENZAPRINE HCL 10 MG
10 TABLET ORAL NIGHTLY PRN
Qty: 10 TABLET | Refills: 0 | Status: SHIPPED | OUTPATIENT
Start: 2022-11-27 | End: 2022-12-07

## 2022-11-27 RX ADMIN — KETOROLAC TROMETHAMINE 15 MG: 15 INJECTION, SOLUTION INTRAMUSCULAR; INTRAVENOUS at 02:09

## 2022-11-27 ASSESSMENT — ENCOUNTER SYMPTOMS
ABDOMINAL PAIN: 0
PHOTOPHOBIA: 0
SHORTNESS OF BREATH: 0
VOMITING: 0
COUGH: 0
NAUSEA: 0
BLOOD IN STOOL: 0
BACK PAIN: 1

## 2022-11-27 ASSESSMENT — PAIN SCALES - GENERAL: PAINLEVEL_OUTOF10: 8

## 2022-11-27 ASSESSMENT — PAIN - FUNCTIONAL ASSESSMENT: PAIN_FUNCTIONAL_ASSESSMENT: 0-10

## 2022-11-27 ASSESSMENT — PAIN DESCRIPTION - LOCATION: LOCATION: BACK;LEG

## 2022-11-27 NOTE — Clinical Note
Adriano Ham was seen and treated in our emergency department on 11/27/2022. Adriano Ham was seen and treated in our emergency department around midnight on 11/26/2022. He may return to work on 11/28/2022. If you have any questions or concerns, please don't hesitate to call.        MD Ori Cain MD

## 2022-11-27 NOTE — Clinical Note
Javier Salas was seen and treated in our emergency department on 11/27/2022. Javier Salas was seen and treated in our emergency department around midnight on 11/26/2022. He may return to work on 11/28/2022. If you have any questions or concerns, please don't hesitate to call.      Mayra Rojas MD

## 2022-11-27 NOTE — Clinical Note
Rashaad Belle was seen and treated in our emergency department on 11/27/2022. He may return to work on 11/28/2022. If you have any questions or concerns, please don't hesitate to call.       Yeni Rice MD

## 2022-11-27 NOTE — ED NOTES
Pt presents to the ED with C/O having back pain and leg pain. Pt stated that it has been hurting for a while. Pt state that he needs a note for work. Pt's vitals are within normal limits. Will continue to monitor and reassess.      Evelia Daniel RN  11/27/22 [id number]

## 2022-11-27 NOTE — DISCHARGE INSTRUCTIONS
You were seen in the emergency department for lower back pain radiating into her left leg. Given that there was no trauma, you have no neurological findings except for mild weakness on the left when compared to the right you are being discharged. This is most likely an acute exacerbation of ongoing lower back pain problems for which you were recently seen here 2 months ago. You must follow-up with a primary care doctor for better management of this lower back pain. They can refer you onto physical therapy as well as the pain management clinic. If you do not have one, call the [address] [place] [address] at the number attached. You have been prescribed a short course of Flexeril. This medication might make you drowsy, only take it at night to help ease the pain while you sleep. Do not drive while on this medication as it might make you drowsy. Return to the emergency department should he have any worsening of your pain, inability to walk, sudden profound weakness, lack of sensation, worsening headache, or any other acute concern.

## 2022-11-27 NOTE — ED PROVIDER NOTES
[facility]  ED  Emergency Department Encounter  Emergency Medicine Resident     Pt Clinton Christianson  MRN: [id number]  [place] 1975  Date of evaluation: 11/27/22  PCP:  No primary care provider on file. CHIEF COMPLAINT       Chief Complaint   Patient presents with    Back Pain    Leg Pain       HISTORY OF PRESENT ILLNESS  (Location/Symptom, Timing/Onset, Context/Setting, Quality, Duration, Modifying Factors, Severity.)      Gustavo Cole is a 50 y.o. male who presents with ongoing lower leg and back pain. Patient is a poor historian but states that he has chronic back pain, states that he woke up in the morning quickly out of bed and has hurt his back since then. The patient denies recent falls, trauma, or other injury. The patient denies saddle anesthesia, paresthesias elsewhere. He states he has difficulty urinating, but states that this has been ongoing for years. Patient states that this lower back pain is accompanied by left lower limb weakness. Patient specifically denies headache, fever, chills, dyspnea, neck pain, incontinence. PAST MEDICAL / SURGICAL / SOCIAL / FAMILY HISTORY      has a past medical history of Arthritis, Asthma, and Depression. has a past surgical history that includes meniscectomy; Finger fracture surgery (Left, 03/31/2022); Finger Closed Reduction (Left, 03/31/2022); and Finger surgery (Left, 3/31/2022).     Social History     Socioeconomic History    Marital status: Single     Spouse name: Not on file    Number of children: Not on file    Years of education: Not on file    Highest education level: Not on file   Occupational History    Not on file   Tobacco Use    Smoking status: Every Day     Packs/day: 0.00     Years: 32.00     Pack years: 0.00     Types: E-Cigarettes, Cigarettes    Smokeless tobacco: Never   Substance and Sexual Activity    Alcohol use: Never    Drug use: Never    Sexual activity: Not on file   Other Topics Concern    Not on file   Social History Narrative    Not on file     Social Determinants of Health     Financial Resource Strain: Not on file   Food Insecurity: Not on file   Transportation Needs: Not on file   Physical Activity: Not on file   Stress: Not on file   Social Connections: Not on file   Intimate Partner Violence: Not on file   Housing Stability: Not on file       History reviewed. No pertinent family history. Allergies:  Shellfish-derived products and Food    Home Medications:  Prior to Admission medications    Medication Sig Start Date End Date Taking? Authorizing Provider   cyclobenzaprine (FLEXERIL) 10 MG tablet Take 1 tablet by mouth nightly as needed for Muscle spasms 11/27/22 12/7/22 Yes Luis Stockton MD   naproxen (NAPROSYN) 500 MG tablet Take 1 tablet by mouth 2 times daily (with meals) 10/30/22   Blanca Lora MD   acetaminophen (TYLENOL) 325 MG tablet Take 1 tablet by mouth every 6 hours as needed for Pain 10/30/22   Blanca Lora MD   lidocaine 4 % external patch Place 1 patch onto the skin daily 10/30/22 11/29/22  Blanca Lora MD   albuterol sulfate HFA (PROVENTIL HFA) 108 (90 Base) MCG/ACT inhaler Inhale 1-2 puffs into the lungs every 4 hours as needed for Wheezing or Shortness of Breath (Space out to every 6 hours as symptoms improve) Space out to every 6 hours as symptoms improve. 9/23/22   Toñito Marie MD   predniSONE (DELTASONE) 50 MG tablet Take 1 tablet by mouth daily 9/18/22   Clark Zepeda MD   ibuprofen (ADVIL;MOTRIN) 600 MG tablet Take 1 tablet by mouth every 6 hours as needed for Pain 8/13/22   Porfirio Velez MD       REVIEW OF SYSTEMS    (2-9 systems for level 4, 10 or more for level 5)      Review of Systems   Constitutional:  Negative for chills and fever. Eyes:  Negative for photophobia and visual disturbance. Respiratory:  Negative for cough and shortness of breath. Cardiovascular:  Negative for chest pain.    Gastrointestinal:  Negative for abdominal pain, blood in stool, nausea and vomiting. Genitourinary:  Positive for difficulty urinating (chronic). Musculoskeletal:  Positive for arthralgias and back pain. Neurological:  Positive for headaches (pt's typical HA). Negative for syncope, weakness and numbness. PHYSICAL EXAM   (up to 7 for level 4, 8 or more for level 5)      INITIAL VITALS:   /82   Pulse 100   Temp 98.1 °F (36.7 °C) (Oral)   Resp 20   Ht 6' 2\" (1.88 m)   Wt 219 lb (99.3 kg)   SpO2 98%   BMI 28.12 kg/m²     Physical Exam  Vitals reviewed. Constitutional:       General: He is not in acute distress. Appearance: Normal appearance. He is not toxic-appearing. HENT:      Head: Normocephalic and atraumatic. Mouth/Throat:      Mouth: Mucous membranes are moist.      Pharynx: Oropharynx is clear. Eyes:      Extraocular Movements: Extraocular movements intact. Pupils: Pupils are equal, round, and reactive to light. Cardiovascular:      Rate and Rhythm: Normal rate and regular rhythm. Pulses: Normal pulses. Pulmonary:      Effort: Pulmonary effort is normal.      Breath sounds: Normal breath sounds. No wheezing. Abdominal:      Palpations: Abdomen is soft. Tenderness: There is no abdominal tenderness. Musculoskeletal:         General: Tenderness (Bilateral lower back pain,) present. Cervical back: Normal range of motion. No rigidity. Right lower leg: No edema. Left lower leg: No edema. Skin:     General: Skin is warm and dry. Neurological:      Mental Status: He is alert and oriented to person, place, and time. Sensory: No sensory deficit. Motor: No weakness. Gait: Gait abnormal (Antalgic favoring right). Deep Tendon Reflexes: Reflexes normal.       DIFFERENTIAL  DIAGNOSIS     PLAN (LABS / IMAGING / EKG):  No orders of the defined types were placed in this encounter.       MEDICATIONS ORDERED:  Orders Placed This Encounter   Medications cyclobenzaprine (FLEXERIL) 10 MG tablet     Sig: Take 1 tablet by mouth nightly as needed for Muscle spasms     Dispense:  10 tablet     Refill:  0    ketorolac (TORADOL) injection 15 mg       DDX: Acute on chronic lower back pain, spinal stenosis, cauda equina syndrome, conus medullaris syndrome were considered. Given the patient's lack of trauma/injury and the preservation of strength and sensation, as well as the lack of saddle anesthesia or urinary retention more sinister pathology is less likely and will forego imaging at this time. Patient is able to ambulate with an antalgic gait and is requesting a work note as well as medication. DIAGNOSTIC RESULTS / EMERGENCY DEPARTMENT COURSE / [facility]   LAB RESULTS:  No results found for this visit on 11/27/22. IMPRESSION: 77-year-old male presenting with acute on chronic lower back pain. Denies injury or trauma to back or spine, is able to ambulate, urinate regularly, and without paresthesias. EMERGENCY DEPARTMENT COURSE:  Following history and examination with the aid of , the patient was discharged with a prescription for Flexeril and with a 2-day work note covering both today and tomorrow. No notes of  Admission Criteria type on file. PROCEDURES:    CONSULTS:  None    CRITICAL CARE:    FINAL IMPRESSION      1.  Bilateral low back pain with left-sided sciatica, unspecified chronicity          DISPOSITION / PLAN     DISPOSITION Decision To Discharge 11/27/2022 01:54:55 AM      PATIENT REFERRED TO:  [facility] ED  [address] [facility]  [phone]  Go to       [address] [place] [id number] [phone]  Schedule an appointment as soon as possible for a visit       DISCHARGE MEDICATIONS:  Discharge Medication List as of 11/27/2022  2:01 AM        START taking these medications    Details   cyclobenzaprine (FLEXERIL) 10 MG tablet Take 1 tablet by mouth nightly as needed for Muscle spasms, Disp-10 tablet, R-0Print             Darshan Pérez MD  Emergency Medicine Resident    (Please note that portions of thisnote were completed with a voice recognition program.  Efforts were made to edit the dictations but occasionally words are mis-transcribed.)      Darshan Pérez MD  Resident  11/28/22 1157

## 2022-12-07 NOTE — ED PROVIDER NOTES
[facility]   Emergency Department  Faculty Attestation       I performed a history and physical examination of the patient and discussed management with the resident. I reviewed the residents note and agree with the documented findings including all diagnostic interpretations and plan of care. Any areas of disagreement are noted on the chart. I was personally present for the key portions of any procedures. I have documented in the chart those procedures where I was not present during the key portions. I have reviewed the emergency nurses triage note. I agree with the chief complaint, past medical history, past surgical history, allergies, medications, social and family history as documented unless otherwise noted below. Documentation of the HPI, Physical Exam and Medical Decision Making performed by [email or web address] is based on my personal performance of the HPI, PE and MDM. For Physician Assistant/ Nurse Practitioner cases/documentation I have personally evaluated this patient and have completed at least one if not all key elements of the E/M (history, physical exam, and MDM). Additional findings are as noted. Pertinent Comments     Primary Care Physician: No primary care provider on file. ED Triage Vitals [11/27/22 0122]   BP Temp Temp Source Heart Rate Resp SpO2 Height Weight   119/82 98.1 °F (36.7 °C) Oral 100 20 98 % 6' 2\" (1.88 m) 219 lb (99.3 kg)        Patient's history and discharge instructions were done via  as patient does only speak [place] This is a 50 y.o. male who presents to the Emergency Department w/ acute on chronic lower back pain that radiates his leg. Is acute on chronic recurrent. Denies any falls no fevers, no numbness weakness or urinary bladder incontinence. On exam, is awake alert no acute distress. Does have bilateral paraspinal tenderness in the lower lumbar region but no midline tenderness.   Does have normal strength and is able to ambulate, with antalgic gait. Normal sensation. Acute on chronic low back pain. No new trauma. No fevers. No red flag symptoms to indicate cauda equina at this time. Therefore no indication for further imaging or labs.   We will treat symptomatically and okay for discharge with outpatient follow-up and return precautions      Critical Care: None     Daija Lora MD  Attending Emergency Physician        Daija Lora MD  12/06/22 2726

## 2023-03-07 ENCOUNTER — APPOINTMENT (OUTPATIENT)
Dept: CT IMAGING | Age: 48
End: 2023-03-07
Payer: OTHER MISCELLANEOUS

## 2023-03-07 ENCOUNTER — HOSPITAL ENCOUNTER (EMERGENCY)
Age: 48
Discharge: HOME | End: 2023-03-07
Attending: EMERGENCY MEDICINE
Payer: OTHER MISCELLANEOUS

## 2023-03-07 VITALS
DIASTOLIC BLOOD PRESSURE: 87 MMHG | SYSTOLIC BLOOD PRESSURE: 139 MMHG | TEMPERATURE: 98.1 F | OXYGEN SATURATION: 99 % | RESPIRATION RATE: 14 BRPM | HEART RATE: 70 BPM
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
## 2023-03-07 PROCEDURE — 72125 CT NECK SPINE W/O DYE: CPT

## 2023-03-07 PROCEDURE — 72131 CT LUMBAR SPINE W/O DYE: CPT

## 2023-03-07 PROCEDURE — 72128 CT CHEST SPINE W/O DYE: CPT

## 2023-03-07 PROCEDURE — 6370000000 HC RX 637 (ALT 250 FOR IP): Performed by: STUDENT IN AN ORGANIZED HEALTH CARE EDUCATION/TRAINING PROGRAM

## 2023-03-07 PROCEDURE — 99284 EMERGENCY DEPT VISIT MOD MDM: CPT

## 2023-03-07 RX ORDER — IBUPROFEN 800 MG/1
800 TABLET ORAL ONCE
Status: COMPLETED | OUTPATIENT
Start: 2023-03-07 | End: 2023-03-07

## 2023-03-07 RX ORDER — LIDOCAINE 4 G/G
1 PATCH TOPICAL DAILY
Status: DISCONTINUED | OUTPATIENT
Start: 2023-03-07 | End: 2023-03-07 | Stop reason: HOSPADM

## 2023-03-07 RX ADMIN — IBUPROFEN 800 MG: 800 TABLET, FILM COATED ORAL at 14:25

## 2023-03-07 ASSESSMENT — PAIN - FUNCTIONAL ASSESSMENT: PAIN_FUNCTIONAL_ASSESSMENT: 0-10

## 2023-03-07 ASSESSMENT — ENCOUNTER SYMPTOMS
BACK PAIN: 1
ABDOMINAL PAIN: 0
NAUSEA: 0
SHORTNESS OF BREATH: 0
VOMITING: 0

## 2023-03-07 ASSESSMENT — PAIN SCALES - GENERAL: PAINLEVEL_OUTOF10: 7
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
## 2023-03-30 ENCOUNTER — APPOINTMENT (OUTPATIENT)
Dept: MRI IMAGING | Age: 48
DRG: 347 | End: 2023-03-30
Payer: COMMERCIAL

## 2023-03-30 ENCOUNTER — HOSPITAL ENCOUNTER (INPATIENT)
Age: 48
LOS: 1 days | Discharge: HOME | DRG: 347 | End: 2023-04-01
Attending: EMERGENCY MEDICINE | Admitting: EMERGENCY MEDICINE
Payer: COMMERCIAL

## 2023-03-30 DIAGNOSIS — M54.50 CHRONIC MIDLINE LOW BACK PAIN, UNSPECIFIED WHETHER SCIATICA PRESENT: Primary | ICD-10-CM

## 2023-03-30 DIAGNOSIS — G89.29 CHRONIC MIDLINE LOW BACK PAIN, UNSPECIFIED WHETHER SCIATICA PRESENT: Primary | ICD-10-CM

## 2023-03-30 DIAGNOSIS — M54.9 INTRACTABLE BACK PAIN: ICD-10-CM

## 2023-03-30 PROBLEM — R32 URINARY INCONTINENCE: Status: ACTIVE | Noted: 2023-03-30

## 2023-03-30 LAB
ABSOLUTE EOS #: 0.3 K/UL (ref 0–0.44)
ABSOLUTE IMMATURE GRANULOCYTE: 0.03 K/UL (ref 0–0.3)
ABSOLUTE LYMPH #: 2.51 K/UL (ref 1.1–3.7)
ABSOLUTE MONO #: 0.5 K/UL (ref 0.1–1.2)
ANION GAP SERPL CALCULATED.3IONS-SCNC: 6 MMOL/L (ref 9–17)
BASOPHILS # BLD: 0 % (ref 0–2)
BASOPHILS ABSOLUTE: 0.03 K/UL (ref 0–0.2)
BUN SERPL-MCNC: 12 MG/DL (ref 6–20)
CALCIUM SERPL-MCNC: 9 MG/DL (ref 8.6–10.4)
CHLORIDE SERPL-SCNC: 108 MMOL/L (ref 98–107)
CO2 SERPL-SCNC: 28 MMOL/L (ref 20–31)
CREAT SERPL-MCNC: 0.77 MG/DL (ref 0.7–1.2)
D DIMER BLD IA.RAPID-MCNC: <0.27 MG/L FEU (ref 0–0.57)
EOSINOPHILS RELATIVE PERCENT: 3 % (ref 1–4)
GFR SERPL CREATININE-BSD FRML MDRD: >60 ML/MIN/1.73M2
GLUCOSE SERPL-MCNC: 89 MG/DL (ref 70–99)
HCT VFR BLD AUTO: 37.5 % (ref 40.7–50.3)
HGB BLD-MCNC: 11.8 G/DL (ref 13–17)
IMMATURE GRANULOCYTES: 0 %
LYMPHOCYTES # BLD: 28 % (ref 24–43)
MCH RBC QN AUTO: 27.2 PG (ref 25.2–33.5)
MCHC RBC AUTO-ENTMCNC: 31.5 G/DL (ref 28.4–34.8)
MCV RBC AUTO: 86.4 FL (ref 82.6–102.9)
MONOCYTES # BLD: 6 % (ref 3–12)
NRBC AUTOMATED: 0 PER 100 WBC
PDW BLD-RTO: 14.1 % (ref 11.8–14.4)
PLATELET # BLD AUTO: 299 K/UL (ref 138–453)
PMV BLD AUTO: 9.4 FL (ref 8.1–13.5)
POTASSIUM SERPL-SCNC: 4.4 MMOL/L (ref 3.7–5.3)
RBC # BLD: 4.34 M/UL (ref 4.21–5.77)
SARS-COV-2 RDRP RESP QL NAA+PROBE: NOT DETECTED
SEG NEUTROPHILS: 63 % (ref 36–65)
SEGMENTED NEUTROPHILS ABSOLUTE COUNT: 5.54 K/UL (ref 1.5–8.1)
SODIUM SERPL-SCNC: 142 MMOL/L (ref 135–144)
SPECIMEN DESCRIPTION: NORMAL
WBC # BLD AUTO: 8.9 K/UL (ref 3.5–11.3)

## 2023-03-30 PROCEDURE — A4216 STERILE WATER/SALINE, 10 ML: HCPCS | Performed by: REGISTERED NURSE

## 2023-03-30 PROCEDURE — 6370000000 HC RX 637 (ALT 250 FOR IP)

## 2023-03-30 PROCEDURE — C9113 INJ PANTOPRAZOLE SODIUM, VIA: HCPCS | Performed by: REGISTERED NURSE

## 2023-03-30 PROCEDURE — G0378 HOSPITAL OBSERVATION PER HR: HCPCS

## 2023-03-30 PROCEDURE — 96374 THER/PROPH/DIAG INJ IV PUSH: CPT

## 2023-03-30 PROCEDURE — 96375 TX/PRO/DX INJ NEW DRUG ADDON: CPT

## 2023-03-30 PROCEDURE — 85379 FIBRIN DEGRADATION QUANT: CPT

## 2023-03-30 PROCEDURE — 85025 COMPLETE CBC W/AUTO DIFF WBC: CPT

## 2023-03-30 PROCEDURE — 6370000000 HC RX 637 (ALT 250 FOR IP): Performed by: REGISTERED NURSE

## 2023-03-30 PROCEDURE — 6360000002 HC RX W HCPCS: Performed by: REGISTERED NURSE

## 2023-03-30 PROCEDURE — 6360000002 HC RX W HCPCS

## 2023-03-30 PROCEDURE — 87635 SARS-COV-2 COVID-19 AMP PRB: CPT

## 2023-03-30 PROCEDURE — 80048 BASIC METABOLIC PNL TOTAL CA: CPT

## 2023-03-30 PROCEDURE — 2580000003 HC RX 258: Performed by: REGISTERED NURSE

## 2023-03-30 PROCEDURE — 99285 EMERGENCY DEPT VISIT HI MDM: CPT

## 2023-03-30 RX ORDER — METHOCARBAMOL 750 MG/1
750 TABLET, FILM COATED ORAL 4 TIMES DAILY
Status: DISCONTINUED | OUTPATIENT
Start: 2023-03-30 | End: 2023-04-01 | Stop reason: HOSPADM

## 2023-03-30 RX ORDER — NAPROXEN 250 MG/1
500 TABLET ORAL 2 TIMES DAILY WITH MEALS
Status: DISCONTINUED | OUTPATIENT
Start: 2023-03-30 | End: 2023-04-01 | Stop reason: HOSPADM

## 2023-03-30 RX ORDER — KETOROLAC TROMETHAMINE 15 MG/ML
15 INJECTION, SOLUTION INTRAMUSCULAR; INTRAVENOUS ONCE
Status: COMPLETED | OUTPATIENT
Start: 2023-03-30 | End: 2023-03-30

## 2023-03-30 RX ORDER — CYCLOBENZAPRINE HCL 10 MG
10 TABLET ORAL ONCE
Status: COMPLETED | OUTPATIENT
Start: 2023-03-30 | End: 2023-03-30

## 2023-03-30 RX ORDER — DEXAMETHASONE SODIUM PHOSPHATE 10 MG/ML
10 INJECTION, SOLUTION INTRAMUSCULAR; INTRAVENOUS ONCE
Status: COMPLETED | OUTPATIENT
Start: 2023-03-30 | End: 2023-03-30

## 2023-03-30 RX ORDER — DEXAMETHASONE SODIUM PHOSPHATE 4 MG/ML
4 INJECTION, SOLUTION INTRA-ARTICULAR; INTRALESIONAL; INTRAMUSCULAR; INTRAVENOUS; SOFT TISSUE EVERY 8 HOURS
Status: COMPLETED | OUTPATIENT
Start: 2023-03-31 | End: 2023-03-31

## 2023-03-30 RX ADMIN — KETOROLAC TROMETHAMINE 15 MG: 15 INJECTION, SOLUTION INTRAMUSCULAR; INTRAVENOUS at 16:21

## 2023-03-30 RX ADMIN — CYCLOBENZAPRINE 10 MG: 10 TABLET, FILM COATED ORAL at 16:20

## 2023-03-30 RX ADMIN — METHOCARBAMOL TABLETS 750 MG: 750 TABLET, COATED ORAL at 20:14

## 2023-03-30 RX ADMIN — DEXAMETHASONE SODIUM PHOSPHATE 10 MG: 10 INJECTION, SOLUTION INTRAMUSCULAR; INTRAVENOUS at 20:14

## 2023-03-30 RX ADMIN — NAPROXEN 500 MG: 250 TABLET ORAL at 20:14

## 2023-03-30 RX ADMIN — SODIUM CHLORIDE, PRESERVATIVE FREE 40 MG: 5 INJECTION INTRAVENOUS at 20:14

## 2023-03-30 ASSESSMENT — ENCOUNTER SYMPTOMS
BACK PAIN: 1
DIARRHEA: 1

## 2023-03-30 ASSESSMENT — PAIN SCALES - GENERAL: PAINLEVEL_OUTOF10: 7

## 2023-03-30 ASSESSMENT — PAIN DESCRIPTION - ORIENTATION: ORIENTATION: MID;LOWER

## 2023-03-30 ASSESSMENT — PAIN DESCRIPTION - LOCATION: LOCATION: BACK

## 2023-03-30 NOTE — ED PROVIDER NOTES
components:       Result Value    Hemoglobin 11.8 (*)     Hematocrit 37.5 (*)     All other components within normal limits   BASIC METABOLIC PANEL - Abnormal; Notable for the following components:    Chloride 108 (*)     Anion Gap 6 (*)     All other components within normal limits   D-DIMER, QUANTITATIVE           PLAN/ TASKS OUTSTANDING     Pt with back pain and urinated on himself. Pending Neurosurgery consult. Post void normal. Left >Right leg weakness.        (Please note that portions of this note were completed with a voice recognition program.  Efforts were made to edit the dictations but occasionally words are mis-transcribed.)    Ulysses Grieve, MD, MD,   Attending Emergency Physician        Ulysses Grieve, MD  03/30/23 9221
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
## 2023-03-30 NOTE — ED PROVIDER NOTES
DTRs in his lower extremities. Plan for MRI here in the department. But he had piercings in his genitalia and that his mouth. Patient initially refused to have them taken out. Attempts were made to remove them. Discussed case with neurosurgery who like the patient admitted observation unit for MRI in the morning. They ordered Decadron and naproxen for his pain. EKG      All EKG's are interpreted by the Emergency Department Physician who either signs or Co-signs this chart in the absence of a cardiologist.    EMERGENCY DEPARTMENT COURSE:      ED Course as of 03/31/23 0111   Thu Mar 30, 2023   1651 D-Dimer, Quant: <0.27  No concern for DVT. [GI]   1651 WBC: 8.9  No leukocytosis [GI]   1651 BMP within normal limits besides mildly elevated chloride. Decrease in anion gap. [GI]   1708 Unable to perform MRI because patient has piercings in his genitals that he cannot remove. Postvoid residual and consult neurosurgery. [GI]   1849 Appears neurosurgery is at bedside. Will await for recommendations. [GI]   1931 Discussed with neurosurgery with the patient admitted to get the MRI in the morning. Ordered Decadron. [GI]   1931 Like to get postvoid residuals. [GI]   2056 SARS-CoV-2, Rapid: Not Detected [GI]      ED Course User Index  [GI] Rom Rooney DO       PROCEDURES:      CONSULTS:  IP CONSULT TO NEUROSURGERY    CRITICAL CARE:  There was significant risk of life threatening deterioration of patient's condition requiring my direct management. Critical care time minutes, excluding any documented procedures. FINAL IMPRESSION      1. Chronic midline low back pain, unspecified whether sciatica present          DISPOSITION / PLAN     DISPOSITION Admitted 03/30/2023 08:15:54 PM      PATIENT REFERRED TO:  No follow-up provider specified.     DISCHARGE MEDICATIONS:  Current Discharge Medication List          Rom Rooney DO  Emergency Medicine Resident    (Please note that portions of thisnote were completed with a voice recognition program.  Efforts were made to edit the dictations but occasionally words are mis-transcribed.)       Nu Short DO  Resident  03/31/23 0111

## 2023-03-30 NOTE — PROGRESS NOTES
MRI called to get patient and RN notified the tech that the patient has a genital piercing that is unable to come out. MRI on hold at this time. Cannot scan patient without removal of piercing.

## 2023-03-30 NOTE — ED PROVIDER NOTES
[facility]  Emergency Department  Faculty Attestation     I performed a history and physical examination of the patient and discussed management with the resident. I reviewed the residents note and agree with the documented findings and plan of care. Any areas of disagreement are noted on the chart. I was personally present for the key portions of any procedures. I have documented in the chart those procedures where I was not present during the key portions. I have reviewed the emergency nurses triage note. I agree with the chief complaint, past medical history, past surgical history, allergies, medications, social and family history as documented unless otherwise noted below. For Physician Assistant/ Nurse Practitioner cases/documentation I have personally evaluated this patient and have completed at least one if not all key elements of the E/M (history, physical exam, and MDM). Additional findings are as noted. Primary Care Physician:  No primary care provider on file. Screenings:  [unfilled]    CHIEF COMPLAINT       Chief Complaint   Patient presents with    Back Pain       RECENT VITALS:   Temp: 98.1 °F (36.7 °C),  Heart Rate: 81, Resp: 16, BP: 107/84    LABS:  Labs Reviewed   D-DIMER, QUANTITATIVE   CBC WITH AUTO DIFFERENTIAL   BASIC METABOLIC PANEL       Radiology  MRI LUMBAR SPINE W WO CONTRAST    (Results Pending)           Attending Physician Additional  Notes    Patient has persistence of low back pain since his injury, CT lumbar was negative though he does have disc disease in the cervical spine. He has been having persistent low back pain with weakness in both lower extremities as well as new urinary incontinence. No saddle anesthesia. No fevers. No complaints of neck pain or upper extremity weakness or paresthesias. On exam he appears comfortable vital signs are normal he is afebrile. Concern is for cauda equina or conus medullaris.   Plan is analgesics, laboratory studies, will screen for inflammatory markers, MRI scan of the lumbar spine. History obtained via . Patient is unable to remove a general piercing. Postvoid residual pending. Will discuss with neurosurgery, consider CT myelogram.        Alaina Hurley.  Molly Engel MD, [facility]  Attending Emergency  Physician                Julito Molina MD  03/30/23 3451       Julito Molina MD  03/30/23 1317

## 2023-03-30 NOTE — LETTER
April 1, 2023       Surekha Morales YOB: 1975   [address] Date of Visit:  3/30/2023       To Whom It May Concern:    Surekha Morales was admitted to the hospital from 3/30 to 4/1/2023    Please excuse his absence over this time period.     Sincerely,        Isabel Crump MD

## 2023-03-31 ENCOUNTER — APPOINTMENT (OUTPATIENT)
Dept: GENERAL RADIOLOGY | Age: 48
DRG: 347 | End: 2023-03-31
Payer: COMMERCIAL

## 2023-03-31 ENCOUNTER — APPOINTMENT (OUTPATIENT)
Dept: MRI IMAGING | Age: 48
DRG: 347 | End: 2023-03-31
Payer: COMMERCIAL

## 2023-03-31 PROBLEM — M54.9 INTRACTABLE BACK PAIN: Status: ACTIVE | Noted: 2023-03-31

## 2023-03-31 LAB
BILIRUBIN URINE: NEGATIVE
COLOR: YELLOW
EPITHELIAL CELLS UA: NORMAL /HPF (ref 0–5)
GLUCOSE UR STRIP.AUTO-MCNC: NEGATIVE MG/DL
KETONES UR STRIP.AUTO-MCNC: NEGATIVE MG/DL
LEUKOCYTE ESTERASE UR QL STRIP.AUTO: NEGATIVE
NITRITE UR QL STRIP.AUTO: NEGATIVE
PROT UR STRIP.AUTO-MCNC: 6.5 MG/DL (ref 5–8)
PROT UR STRIP.AUTO-MCNC: NEGATIVE MG/DL
RBC CLUMPS #/AREA URNS AUTO: NORMAL /HPF (ref 0–4)
SPECIFIC GRAVITY UA: 1.02 (ref 1–1.03)
TURBIDITY: CLEAR
URINE HGB: NEGATIVE
UROBILINOGEN, URINE: NORMAL
WBC UA: NORMAL /HPF (ref 0–5)

## 2023-03-31 PROCEDURE — 6360000002 HC RX W HCPCS: Performed by: REGISTERED NURSE

## 2023-03-31 PROCEDURE — 72100 X-RAY EXAM L-S SPINE 2/3 VWS: CPT

## 2023-03-31 PROCEDURE — 6370000000 HC RX 637 (ALT 250 FOR IP): Performed by: REGISTERED NURSE

## 2023-03-31 PROCEDURE — 81001 URINALYSIS AUTO W/SCOPE: CPT

## 2023-03-31 PROCEDURE — 72148 MRI LUMBAR SPINE W/O DYE: CPT

## 2023-03-31 PROCEDURE — 96376 TX/PRO/DX INJ SAME DRUG ADON: CPT

## 2023-03-31 PROCEDURE — 1200000000 HC SEMI PRIVATE

## 2023-03-31 RX ORDER — PREDNISONE 50 MG/1
50 TABLET ORAL DAILY
Qty: 4 TABLET | Refills: 0 | Status: SHIPPED | OUTPATIENT
Start: 2023-03-31 | End: 2023-04-01 | Stop reason: HOSPADM

## 2023-03-31 RX ORDER — ALBUTEROL SULFATE 90 UG/1
1-2 AEROSOL, METERED RESPIRATORY (INHALATION) EVERY 4 HOURS PRN
Qty: 18 G | Refills: 0 | Status: SHIPPED | OUTPATIENT
Start: 2023-03-31

## 2023-03-31 RX ORDER — NAPROXEN 500 MG/1
500 TABLET ORAL 2 TIMES DAILY WITH MEALS
Qty: 10 TABLET | Refills: 0 | Status: SHIPPED | OUTPATIENT
Start: 2023-03-31

## 2023-03-31 RX ORDER — ACETAMINOPHEN 325 MG/1
325 TABLET ORAL EVERY 6 HOURS PRN
Qty: 30 TABLET | Refills: 0 | Status: SHIPPED | OUTPATIENT
Start: 2023-03-31

## 2023-03-31 RX ADMIN — DEXAMETHASONE SODIUM PHOSPHATE 4 MG: 4 INJECTION, SOLUTION INTRAMUSCULAR; INTRAVENOUS at 03:11

## 2023-03-31 RX ADMIN — DEXAMETHASONE SODIUM PHOSPHATE 4 MG: 4 INJECTION, SOLUTION INTRAMUSCULAR; INTRAVENOUS at 10:32

## 2023-03-31 RX ADMIN — METHOCARBAMOL TABLETS 750 MG: 750 TABLET, COATED ORAL at 20:38

## 2023-03-31 RX ADMIN — METHOCARBAMOL TABLETS 750 MG: 750 TABLET, COATED ORAL at 10:36

## 2023-03-31 RX ADMIN — DEXAMETHASONE SODIUM PHOSPHATE 4 MG: 4 INJECTION, SOLUTION INTRAMUSCULAR; INTRAVENOUS at 19:16

## 2023-03-31 RX ADMIN — NAPROXEN 500 MG: 250 TABLET ORAL at 19:14

## 2023-03-31 RX ADMIN — NAPROXEN 500 MG: 250 TABLET ORAL at 10:31

## 2023-03-31 ASSESSMENT — ENCOUNTER SYMPTOMS
ABDOMINAL PAIN: 0
SHORTNESS OF BREATH: 0
COUGH: 0
CHEST TIGHTNESS: 0
EYE REDNESS: 0
NAUSEA: 0
EYE PAIN: 0
VOMITING: 0
FACIAL SWELLING: 0

## 2023-03-31 ASSESSMENT — PAIN SCALES - GENERAL
PAINLEVEL_OUTOF10: 7
PAINLEVEL_OUTOF10: 5
PAINLEVEL_OUTOF10: 3

## 2023-03-31 ASSESSMENT — PAIN DESCRIPTION - ORIENTATION
ORIENTATION: LEFT
ORIENTATION: MID;LOWER
ORIENTATION: LOWER;LEFT

## 2023-03-31 ASSESSMENT — PAIN DESCRIPTION - LOCATION
LOCATION: BACK;LEG
LOCATION: BACK
LOCATION: BACK;LEG

## 2023-03-31 ASSESSMENT — PAIN DESCRIPTION - DESCRIPTORS
DESCRIPTORS: ACHING;SORE
DESCRIPTORS: SHOOTING
DESCRIPTORS: SHOOTING

## 2023-03-31 ASSESSMENT — PAIN - FUNCTIONAL ASSESSMENT
PAIN_FUNCTIONAL_ASSESSMENT: PREVENTS OR INTERFERES SOME ACTIVE ACTIVITIES AND ADLS
PAIN_FUNCTIONAL_ASSESSMENT: PREVENTS OR INTERFERES SOME ACTIVE ACTIVITIES AND ADLS

## 2023-03-31 NOTE — CARE COORDINATION
Case Management Assessment  Initial Evaluation    Date/Time of Evaluation: 3/31/2023 11:58 AM  Assessment Completed by: Seema Gautam RN    If patient is discharged prior to next notation, then this note serves as note for discharge by case management. Patient Name: Christian Avery                   YOB: 1975  Diagnosis: Back pain at L4-L5 level [M54.50]                   Date / Time: 3/30/2023  3:16 PM    Patient Admission Status: Observation   Readmission Risk (Low < 19, Mod (19-27), High > 27): No data recorded  Current PCP: No primary care provider on file. PCP verified by CM? (P) Yes    Chart Reviewed: Yes      History Provided by: (P) Patient  Patient Orientation: (P) Alert and Oriented    Patient Cognition: (P) Alert    Hospitalization in the last 30 days (Readmission):  No    If yes, Readmission Assessment in CM Navigator will be completed.     Advance Directives:      Code Status: Not on file   Patient's Primary Decision Maker is:        Discharge Planning:    Patient lives with: (P) Alone Type of Home: (P) Apartment  Primary Care Giver: (P) Self  Patient Support Systems include: (P) Family Members   Current Financial resources:    Current community resources: (P) None  Current services prior to admission: (P) None            Current DME:              Type of Home Care services:  (P) None    ADLS  Prior functional level: (P) Independent in ADLs/IADLs  Current functional level: (P) Independent in ADLs/IADLs    PT AM-PAC:   /24  OT AM-PAC:   /24    Family can provide assistance at DC: (P) No  Would you like Case Management to discuss the discharge plan with any other family members/significant others, and if so, who? (P) No  Plans to Return to Present Housing: (P) Yes  Other Identified Issues/Barriers to RETURNING to current housing: none  Potential Assistance needed at discharge: (P) N/A            Potential DME:    Patient expects to discharge to: (P) [address] [place] [address] transportation at discharge: (P) Self    Financial    Payor: Dameon Leblanc / Plan: Boni Umanzor / Product Type: *No Product type* /     Does insurance require precert for SNF: Yes    Potential assistance Purchasing Medications: (P) No  Meds-to-Beds request: Yes      [id number] [facility] [phone] Reid Burgess [phone] [address] [facility] [address]  Phone: [phone] Fax: [phone]      Notes:    Factors facilitating achievement of predicted outcomes: Motivated, Cooperative, and Pleasant    Barriers to discharge: medical clearance    Additional Case Management Notes: goal is home, will need to monitor for transp needs    The Plan for Transition of Care is related to the following treatment goals of Back pain at L4-L5 level [H09.77]    IF APPLICABLE: The Patient and/or patient representative Georgia Bull and his family were provided with a choice of provider and agrees with the discharge plan. Freedom of choice list with basic dialogue that supports the patient's individualized plan of care/goals and shares the quality data associated with the providers was provided to: (P) Patient   Patient Representative Name:       The Patient and/or Patient Representative Agree with the Discharge Plan?  (P) Yes    Elio Bartholomew RN  Case Management Department  Ph: [phone]
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
## 2023-03-31 NOTE — PROGRESS NOTES
Spoke with patient at length regarding penis and tongue piercing and the need to remove them prior to MRI. Patient stated he might have difficulty removing them because they have been in so long. Patient was defensive, stated he had an MRI in [facility] and they did not make him remove them. Patient educated on hospital MRI policy and the need to remove them for safety. Patient stated he will not remove them until 1 hour prior to MRI. He states they are expensive and he does not want to lose them. He stated that getting his piercing out will not be an issue but he will not do it early. Patient was instructed that earrings and all other jewelry will have to come off for MRI but will be kept safe for him and he can put them back on after the test. Patient agreeable to MRI. OK to schedule MRI L-spine for 3/31 in the am. Patient needs to take off all piercing prior to test. He is agreeable.      Mame Douglas, APRN - CNP

## 2023-03-31 NOTE — DISCHARGE SUMMARY
Not indicated      Diagnostic Test:   Results for orders placed or performed during the hospital encounter of 03/30/23   COVID-19, Rapid    Specimen: Nasopharyngeal Swab   Result Value Ref Range    Specimen Description . NASOPHARYNGEAL SWAB     SARS-CoV-2, Rapid Not Detected Not Detected   D-Dimer, Quantitative   Result Value Ref Range    D-Dimer, Quant <0.27 0.00 - 0.57 mg/L FEU   CBC with Auto Differential   Result Value Ref Range    WBC 8.9 3.5 - 11.3 k/uL    RBC 4.34 4.21 - 5.77 m/uL    Hemoglobin 11.8 (L) 13.0 - 17.0 g/dL    Hematocrit 37.5 (L) 40.7 - 50.3 %    MCV 86.4 82.6 - 102.9 fL    MCH 27.2 25.2 - 33.5 pg    MCHC 31.5 28.4 - 34.8 g/dL    RDW 14.1 11.8 - 14.4 %    Platelets 734 391 - 347 k/uL    MPV 9.4 8.1 - 13.5 fL    NRBC Automated 0.0 0.0 per 100 WBC    Seg Neutrophils 63 36 - 65 %    Lymphocytes 28 24 - 43 %    Monocytes 6 3 - 12 %    Eosinophils % 3 1 - 4 %    Basophils 0 0 - 2 %    Immature Granulocytes 0 0 %    Segs Absolute 5.54 1.50 - 8.10 k/uL    Absolute Lymph # 2.51 1.10 - 3.70 k/uL    Absolute Mono # 0.50 0.10 - 1.20 k/uL    Absolute Eos # 0.30 0.00 - 0.44 k/uL    Basophils Absolute 0.03 0.00 - 0.20 k/uL    Absolute Immature Granulocyte 0.03 0.00 - 0.30 k/uL   Basic Metabolic Panel   Result Value Ref Range    Glucose 89 70 - 99 mg/dL    BUN 12 6 - 20 mg/dL    Creatinine 0.77 0.70 - 1.20 mg/dL    Est, Glom Filt Rate >60 >60 mL/min/1.73m2    Calcium 9.0 8.6 - 10.4 mg/dL    Sodium 142 135 - 144 mmol/L    Potassium 4.4 3.7 - 5.3 mmol/L    Chloride 108 (H) 98 - 107 mmol/L    CO2 28 20 - 31 mmol/L    Anion Gap 6 (L) 9 - 17 mmol/L   Urinalysis with Microscopic   Result Value Ref Range    Color, UA Yellow Yellow    Turbidity UA Clear Clear    Glucose, Ur NEGATIVE NEGATIVE    Bilirubin Urine NEGATIVE NEGATIVE    Ketones, Urine NEGATIVE NEGATIVE    Specific Gravity, UA 1.022 1.005 - 1.030    Urine Hgb NEGATIVE NEGATIVE    pH, UA 6.5 5.0 - 8.0    Protein, UA NEGATIVE NEGATIVE    Urobilinogen, Urine Foraminal narrowing as described above. Physical Exam:    General appearance - NAD, AOx 3    Lungs -CTAB, no R/R/R  Heart - RRR, no M/R/G  Abdomen - Soft, NT/ND  Neurological:  MAEx4, No focal motor deficit, sensory loss  Extremities - Cap refil <2 sec in all ext., no edema  Skin -warm, dry      Hospital Course:  Clinical course has improved, labs and imaging reviewed. Meghan Laurent originally presented to the hospital on 3/30/2023  3:16 PM with back pain. At that time it was determined that He required further observation and neurosurgical evaluation. Patient seen by neurosurgery and advanced imaging was performed. Patient had MRIs of both lumbar spine and cervical spine. No further interventions required per surgical specialist.  Patient subsequently felt to require ongoing analgesia, treated with nonsteroidals, follow-up. . Labs and imaging were followed daily. Imaging results as above. He is medically stable to be discharged. Disposition: Home    Patient stated that they will not drive themselves home from the hospital if they have gotten pain killers/ narcotics earlier that day and that they will arrange for transportation on their own or work with the  for a ride. Patient counseled NOT to drive while under the influence of narcotics/ pain killers. Condition: Good    Patient stable and ready for discharge home. I have discussed plan of care with patient and they are in understanding. They were instructed to read discharge paperwork. All of their questions and concerns were addressed. Time Spent: 1 day      --  Vee Phoenix MD  Emergency Medicine Attending Physician    This dictation was generated by voice recognition computer software. Although all attempts are made to edit the dictation for accuracy, there may be errors in the transcription that are not intended.

## 2023-03-31 NOTE — DISCHARGE INSTRUCTIONS
Follow-up with your primary care physician. Follow-up as neurosurgery asks.   Return for new trouble
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
## 2023-03-31 NOTE — ED NOTES
Report called to Esther Richardson on [facility], [address] [place] [address]  03/30/23 2122

## 2023-04-01 ENCOUNTER — APPOINTMENT (OUTPATIENT)
Dept: MRI IMAGING | Age: 48
DRG: 347 | End: 2023-04-01
Payer: COMMERCIAL

## 2023-04-01 ENCOUNTER — APPOINTMENT (OUTPATIENT)
Dept: GENERAL RADIOLOGY | Age: 48
DRG: 347 | End: 2023-04-01
Payer: COMMERCIAL

## 2023-04-01 VITALS
TEMPERATURE: 98.4 F | HEART RATE: 70 BPM | SYSTOLIC BLOOD PRESSURE: 113 MMHG | RESPIRATION RATE: 16 BRPM | DIASTOLIC BLOOD PRESSURE: 65 MMHG | OXYGEN SATURATION: 97 %

## 2023-04-01 PROCEDURE — 2580000003 HC RX 258: Performed by: REGISTERED NURSE

## 2023-04-01 PROCEDURE — C9113 INJ PANTOPRAZOLE SODIUM, VIA: HCPCS | Performed by: REGISTERED NURSE

## 2023-04-01 PROCEDURE — 72141 MRI NECK SPINE W/O DYE: CPT

## 2023-04-01 PROCEDURE — 72040 X-RAY EXAM NECK SPINE 2-3 VW: CPT

## 2023-04-01 PROCEDURE — 6360000002 HC RX W HCPCS: Performed by: REGISTERED NURSE

## 2023-04-01 PROCEDURE — 6370000000 HC RX 637 (ALT 250 FOR IP): Performed by: REGISTERED NURSE

## 2023-04-01 PROCEDURE — APPSS30 APP SPLIT SHARED TIME 16-30 MINUTES: Performed by: REGISTERED NURSE

## 2023-04-01 RX ADMIN — METHOCARBAMOL TABLETS 750 MG: 750 TABLET, COATED ORAL at 13:22

## 2023-04-01 RX ADMIN — NAPROXEN 500 MG: 250 TABLET ORAL at 08:45

## 2023-04-01 RX ADMIN — METHOCARBAMOL TABLETS 750 MG: 750 TABLET, COATED ORAL at 08:45

## 2023-04-01 RX ADMIN — SODIUM CHLORIDE, PRESERVATIVE FREE 40 MG: 5 INJECTION INTRAVENOUS at 08:45

## 2023-04-01 ASSESSMENT — PAIN SCALES - GENERAL: PAINLEVEL_OUTOF10: 8

## 2023-04-01 NOTE — PROGRESS NOTES
OBS/CDU   RESIDENT NOTE      Patients PCP is: No primary care provider on file. SUBJECTIVE      Patient seen in bed this morning. No acute events overnight. Seen ambulating in the hallways without difficulty. Tolerating diet. PHYSICAL EXAM      General: NAD, AO X 3  Heent: EMOI, PERRL  Neck: SUPPLE, NO JVD  Cardiovascular: RRR, S1S2  Pulmonary: CTAB, NO SOB  Abdomen: SOFT, NTTP, ND, +BS  Extremities: +2/4 PULSES DISTAL, NO SWELLING  Neuro / Psych: NO NUMBNESS OR TINGLING, MENTATION AT BASELINE    PERTINENT TEST /EXAMS      I have reviewed all available laboratory results. MEDICATIONS CURRENT   methocarbamol (ROBAXIN) tablet 750 mg, 4x Daily  naproxen (NAPROSYN) tablet 500 mg, BID WC  pantoprazole (PROTONIX) 40 mg in sodium chloride (PF) 0.9 % 10 mL injection, Daily        All medication charted and reviewed. CONSULTS      IP CONSULT TO NEUROSURGERY    ASSESSMENT/PLAN       Olimpia Correia is a 50 y.o. male who presents with      Back pain  - Neurosurgery consulted; MRI conducted demonstrating L5-S1 circumferential disc bulge and posterior annular tear. Otherwise multilevel degenerative changes. - Dexamethasone 4 mg every 8  - Naproxen 500 twice daily, Robaxin-750 4 times daily  - Every 6 hours bladder scans  -Patient now with C-spine complaints. MRI C-spine ordered and pending. Continue home medications and pain control  Monitor vitals, labs, and imaging  DISPO: pending consults and clinical improvement    --  Edmundo Sanz MD  Emergency Medicine Resident Physician     This dictation was generated by voice recognition computer software. Although all attempts are made to edit the dictation for accuracy, there may be errors in the transcription that are not intended.

## 2023-04-01 NOTE — PLAN OF CARE
MRI cervical reviewed with patient utilizing . Patient would like to go home and follow up OP with pain management for NIELS. Patient will need close follow up for cervical stenosis.    Follow up with Dr Melissa Ham 4-6 weeks for cervical spinal cord stenosis  Recommend OP physical therapy

## 2023-04-01 NOTE — CARE COORDINATION
Discharge 1 [facility] Case Management Department  Written by: Toñito Gage RN    Patient Name: [facility]  Attending Provider: Klever Howell MD  Admit Date: 3/30/2023  3:16 PM  MRN: [id number]  Account: [de-identified]                     : 1975  Discharge Date: 23  Transportation arrangements made through [email or web address]  Trip id # [id number]    Disposition: home    Toñito Gage RN

## 2023-04-01 NOTE — DISCHARGE SUMMARY
to the hospital on 3/30/2023  3:16 PM. Labs and imaging were followed daily. He underwent CT, MRI L and C spine. 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 L spine showing mild foraminal stenosis, and posterior annular tear at L5-S1. Was assessed by neurosurgery, no urgent surgical intervention. Could be candidate for injections as outpatient. Discharged home with NSAIDS, and instruction to follow up with [address] [place] [address] as outpatient. At time of discharge, Scarlett Bess was tolerating a PO intake well, passing flatus, urinating adequately, ambulating and had adequate analgesia on oral pain medications. He is medically stable to be discharged. Clinical course has improved. I feel the patient can be safely discharged to home with outpatient follow up. Instructions have been given for the patient to return to the ED for any worsening of the symptoms, including but not limited to increased pain, shortness of breath, weakness, or any deterioration of their current condition . Disposition: Home    Condition: Good      Patient stable and ready for discharge home. I have discussed plan of care with patient and they are in understanding. They were instructed to read discharge paperwork. All of their questions and concerns were addressed.      Patient states that they understand the plan and agree with the plan      Time Spent: Nory Bustos MD  Emergency Medicine Resident Physician

## 2023-04-02 NOTE — PROGRESS NOTES
[facility]  CDU / OBSERVATION ENCOUNTER  ATTENDING NOTE       I performed a history and physical examination of the patient and discussed management with the resident or midlevel provider. I reviewed the resident or midlevel provider's note and agree with the documented findings and plan of care. Any areas of disagreement are noted on the chart. I was personally present for the key portions of any procedures. I have documented in the chart those procedures where I was not present during the key portions. I have reviewed the nurses notes. I agree with the chief complaint, past medical history, past surgical history, allergies, medications, social and family history as documented unless otherwise noted below. The Family history, social history, and ROS are effectively unchanged since admission unless noted elsewhere in the chart. This patient was placed in the observation unit for reevaluation for possible admission to the hospital    Patient with excellent neurologic exam.  Patient ambulatory easily. Patient evaluated with advanced imaging and neurosurgical evaluation. No surgical need. Communicated through . Patient asking to leave. Patient subsequently discharged with plan follow-up.     Baljit Muniz MD  Attending Emergency  Physician

## 2023-05-03 ENCOUNTER — HOSPITAL ENCOUNTER (EMERGENCY)
Age: 48
Discharge: HOME | End: 2023-05-03
Attending: EMERGENCY MEDICINE
Payer: COMMERCIAL

## 2023-05-03 VITALS
HEART RATE: 82 BPM | DIASTOLIC BLOOD PRESSURE: 85 MMHG | SYSTOLIC BLOOD PRESSURE: 128 MMHG | RESPIRATION RATE: 16 BRPM | OXYGEN SATURATION: 99 % | TEMPERATURE: 98.2 F

## 2023-05-03 DIAGNOSIS — G89.29 CHRONIC LOW BACK PAIN, UNSPECIFIED BACK PAIN LATERALITY, UNSPECIFIED WHETHER SCIATICA PRESENT: Primary | ICD-10-CM

## 2023-05-03 DIAGNOSIS — M54.50 CHRONIC LOW BACK PAIN, UNSPECIFIED BACK PAIN LATERALITY, UNSPECIFIED WHETHER SCIATICA PRESENT: Primary | ICD-10-CM

## 2023-05-03 DIAGNOSIS — K13.0 CELLULITIS OF SKIN OF LIP: ICD-10-CM

## 2023-05-03 PROCEDURE — 99283 EMERGENCY DEPT VISIT LOW MDM: CPT

## 2023-05-03 RX ORDER — CEPHALEXIN 500 MG/1
500 CAPSULE ORAL 3 TIMES DAILY
Qty: 21 CAPSULE | Refills: 0 | Status: SHIPPED | OUTPATIENT
Start: 2023-05-03

## 2023-05-03 NOTE — ED TRIAGE NOTES
Pt presents to ED c/o back pain. Upon patient assessment, pt states that he would like a note for work that he was seen here today at the ED. Pt also states that he would like to be set up with a PCP since he does not have one. Pt resting on stretcher and call light placed within reach.

## 2023-05-03 NOTE — ED PROVIDER NOTES
[facility]  EMERGENCY DEPARTMENT ENCOUNTER      PtName: Kulwinder Shelley  MRN: [id number]  [place] 1975  Date of evaluation: 5/3/23  Note Started: 12:16 PM EDT      HISTORY OF PRESENT ILLNESS     Kulwinder Shelley is a 50 y.o. male who presents back pain. Patient presents emerged department complaint of back pain which he has had since his recent admission to the hospital on . Pain has not changed in any intensity. He says he needs a note from work stating his work restrictions. He says he had been given the name of the neurosurgeon that he saw but he had recently moved and lost the note. He also is requesting assistance with PCP follow-up. History is obtained with a . PAST MEDICAL HISTORY    has a past medical history of Arthritis, Asthma, and Depression. SURGICAL HISTORY      has a past surgical history that includes meniscectomy; Finger fracture surgery (Left, 2022); Finger Closed Reduction (Left, 2022); and Finger surgery (Left, 3/31/2022). CURRENT MEDICATIONS       Previous Medications    ACETAMINOPHEN (TYLENOL) 325 MG TABLET    Take 1 tablet by mouth every 6 hours as needed for Pain    ALBUTEROL SULFATE HFA (PROVENTIL HFA) 108 (90 BASE) MCG/ACT INHALER    Inhale 1-2 puffs into the lungs every 4 hours as needed for Wheezing or Shortness of Breath (Space out to every 6 hours as symptoms improve) Space out to every 6 hours as symptoms improve. NAPROXEN (NAPROSYN) 500 MG TABLET    Take 1 tablet by mouth 2 times daily (with meals)       ALLERGIES     is allergic to shellfish-derived products and food. FAMILY HISTORY     He indicated that his mother is . family history is not on file. SOCIAL HISTORY      reports that he has been smoking e-cigarettes and cigarettes. He has never used smokeless tobacco. He reports that he does not drink alcohol and does not use drugs.     PHYSICAL EXAM     INITIAL VITALS:  oral

## 2023-05-03 NOTE — DISCHARGE INSTRUCTIONS
If you notice any concerning symptoms please return to the ER immediately. These can include but are not limited to: fevers, chills, shortness of breath, vomiting, weakness of the extremities, changes in your mental status, numbness, pale extremities, or chest pain. Maria Teresa Garrett!!!    From [facility] Emergency Department    On behalf of the Emergency Department staff at [facility]. [facility] Emergency [facility], I would like to thank you for giving [facility] the opportunity to address your health care needs and concerns. We hope that during your visit, our service was delivered in a professional and caring manner. Please keep [facility] in mind as we walk with you down the path to your own personal wellness. Please expect an automated phone call from [phone] so we can ask a few questions about your health and progress. Based on your answers, a clinician may call you back to offer help and instructions. Alina [facility]  ED Clinic List    Healthcare Providers Services [facility]  [address] [place] [address]  [phone] Pediatric Primary Care  Adult Primary Care  OB/GYN/Prenatal/Specialty Clinics Monday - Friday  8:00a - 4:30p   [address] [place] [phone] Adult Medicine, Pediatrics, OB/GYN Monday - Friday  8:30a - [address] [facility]  [phone]  Wednesday 8:30a - 11:00a   [address] Adult Internal Medicine   \[facility] [place] [facility]\""  [phone]  [facility]  [phone]    Pediatric Clinic  [phone]   Monday, Tuesday, Thursday, Friday  8:00a - 4:30p  Wednesday 1:00p - 4:30p  Monday, Tuesday, Thursday 8:00a - 5:00p;  Friday 8:00a - 12:30p  Wednesday 1p - 4p  Monday, Tuesday, Thursday, Friday  8:30a - 4:15p  Wednesday 12:30p - [address]  [address] [facility]  [phone] Pediatric Primary Care  Adult Primary

## 2023-05-08 PROBLEM — G89.29 CHRONIC MIDLINE LOW BACK PAIN: Status: ACTIVE | Noted: 2023-03-30

## 2023-05-10 ENCOUNTER — HOSPITAL ENCOUNTER (EMERGENCY)
Age: 48
Discharge: HOME | End: 2023-05-10
Attending: EMERGENCY MEDICINE
Payer: COMMERCIAL

## 2023-05-10 VITALS
DIASTOLIC BLOOD PRESSURE: 88 MMHG | OXYGEN SATURATION: 98 % | RESPIRATION RATE: 16 BRPM | TEMPERATURE: 98.3 F | HEART RATE: 81 BPM | SYSTOLIC BLOOD PRESSURE: 116 MMHG

## 2023-05-10 DIAGNOSIS — L02.31 ABSCESS OF BUTTOCK, RIGHT: Primary | ICD-10-CM

## 2023-05-10 PROCEDURE — 99283 EMERGENCY DEPT VISIT LOW MDM: CPT

## 2023-05-10 RX ORDER — DOXYCYCLINE HYCLATE 100 MG
100 TABLET ORAL 2 TIMES DAILY
Qty: 14 TABLET | Refills: 0 | Status: SHIPPED | OUTPATIENT
Start: 2023-05-10 | End: 2023-05-17

## 2023-05-10 RX ORDER — DOXYCYCLINE HYCLATE 100 MG
100 TABLET ORAL ONCE
Status: DISCONTINUED | OUTPATIENT
Start: 2023-05-10 | End: 2023-05-10 | Stop reason: HOSPADM

## 2023-05-10 ASSESSMENT — ENCOUNTER SYMPTOMS
ABDOMINAL PAIN: 0
COUGH: 0
SHORTNESS OF BREATH: 0
COLOR CHANGE: 1
VOMITING: 0
RHINORRHEA: 0
NAUSEA: 0
BACK PAIN: 0
DIARRHEA: 0

## 2023-05-10 NOTE — ED PROVIDER NOTES
[facility]  ED  Emergency Department Encounter  Emergency Medicine Resident     Pt Cosmo Duran  MRN: [id number]  [place] 1975  Date of evaluation: 5/10/23  PCP:  No primary care provider on file. Note Started: 8:54 PM EDT      CHIEF COMPLAINT       Chief Complaint   Patient presents with    Mass    Abscess       HISTORY OF PRESENT ILLNESS  (Location/Symptom, Timing/Onset, Context/Setting, Quality, Duration, Modifying Factors, Severity.)      Sim Pantoja is a 50 y.o. male who presents with complaints of bumps on his buttocks. Patient states he knows he is today and has been feeling chilled recently. He denies any fever, chest pain, abdominal pain, nausea or vomiting. He feels that he is chilled because of these bumps that he noticed. He states he tried to squeeze some of them but was unable to. He states there is 2 of them. Patient was recently evaluated here and started on Keflex for cellulitis. Unclear whether patient is taking this. history obtained with . PAST MEDICAL / SURGICAL / SOCIAL / FAMILY HISTORY      has a past medical history of Arthritis, Asthma, and Depression. has a past surgical history that includes meniscectomy; Finger fracture surgery (Left, 03/31/2022); Finger Closed Reduction (Left, 03/31/2022); and Finger surgery (Left, 3/31/2022).       Social History     Socioeconomic History    Marital status: Single     Spouse name: Not on file    Number of children: Not on file    Years of education: Not on file    Highest education level: Not on file   Occupational History    Not on file   Tobacco Use    Smoking status: Every Day     Packs/day: 0.00     Years: 32.00     Pack years: 0.00     Types: E-Cigarettes, Cigarettes    Smokeless tobacco: Never   Substance and Sexual Activity    Alcohol use: Never    Drug use: Never    Sexual activity: Not on file   Other Topics Concern    Not on file   Social History Narrative

## 2023-05-10 NOTE — DISCHARGE INSTRUCTIONS
You were seen in the emergency department today for abscesses on the buttock. Please take the antibiotics as prescribed. Discontinue the Keflex    Please follow-up with your doctor for reevaluation. If you are not having any improvement of the abscesses, please return to the emergency department for reevaluation. Thank you for visiting [facility] Emergency Department. You need to call No primary care provider on file. to make an appointment as directed for follow up. Should you have any questions regarding your care or further treatment, please call [facility] at [phone]. Take any medications as prescribed, if given any, otherwise for pain Use ibuprofen or Tylenol (unless prescribed medications that have Tylenol in it). You can take over the counter Ibuprofen (advil) tablets (4 tablets every 8 hours or 3 tablets every 6 hours or 2 tablets every 4 hours)    If given narcotics during this ED visit, please do not drive or operate heavy machinery for at least 4-6 hours. PLEASE RETURN TO THE ED IMMEDIATELY for worsening symptoms, or if you develop any concerning symptoms such as: high fever not relieved by tylenol and/or motrin, chills, shortness of breath, chest pain, persistent nausea and/or vomiting, numbness, weakness or tingling in the arms or legs or change in color of the extremities, changes in mental status, persistent headache, blurry vision, inability to urinate, unable to follow up with your physician, or other any other  Care or concern.

## 2023-05-10 NOTE — ED PROVIDER NOTES
[address] [facility]     Emergency Department     Faculty Attestation        I performed a history and physical examination of the patient and discussed management with the resident. I reviewed the residents note and agree with the documented findings and plan of care. Any areas of disagreement are noted on the chart. I was personally present for the key portions of any procedures. I have documented in the chart those procedures where I was not present during the key portions. I have reviewed the emergency nurses triage note. I agree with the chief complaint, past medical history, past surgical history, allergies, medications, social and family history as documented unless otherwise noted below. For Physician Assistant/ Nurse Practitioner cases/documentation I have personally evaluated this patient and have completed at least one if not all key elements of the E/M (history, physical exam, and MDM). Additional findings are as noted. Vital Signs: BP: 116/88  Pulse: 81  Respirations: 16  Temp: 98.3 °F (36.8 °C) SpO2: 98 %  PCP:  No primary care provider on file. Note Started: 5/10/23, 5:52 PM EDT    Pertinent Comments:         Critical Care  None      (Please note that portions of this note were completed with a voice recognition program. Efforts were made to edit the dictations but occasionally words are mis-transcribed.  Whenever words are used in this note in any gender, they shall be construed as though they were used in the gender appropriate to the circumstances; and whenever words are used in this note in the singular or plural form, they shall be construed as though they were used in the form appropriate to the circumstances.)    Aniya Clement MD Lucas Merl  Attending Emergency Medicine Physician           Lisbeth Negrno MD  05/10/23 7223

## 2023-09-02 ENCOUNTER — APPOINTMENT (OUTPATIENT)
Dept: CT IMAGING | Age: 48
End: 2023-09-02
Payer: COMMERCIAL

## 2023-09-02 ENCOUNTER — HOSPITAL ENCOUNTER (EMERGENCY)
Age: 48
Discharge: HOME | End: 2023-09-02
Attending: EMERGENCY MEDICINE
Payer: COMMERCIAL

## 2023-09-02 VITALS
RESPIRATION RATE: 12 BRPM | TEMPERATURE: 97.3 F | OXYGEN SATURATION: 97 % | WEIGHT: 210 LBS | BODY MASS INDEX: 26.96 KG/M2 | DIASTOLIC BLOOD PRESSURE: 68 MMHG | SYSTOLIC BLOOD PRESSURE: 102 MMHG | HEART RATE: 73 BPM

## 2023-09-02 DIAGNOSIS — G89.29 CHRONIC MIDLINE LOW BACK PAIN, UNSPECIFIED WHETHER SCIATICA PRESENT: ICD-10-CM

## 2023-09-02 DIAGNOSIS — M54.50 CHRONIC MIDLINE LOW BACK PAIN, UNSPECIFIED WHETHER SCIATICA PRESENT: ICD-10-CM

## 2023-09-02 DIAGNOSIS — G43.709 CHRONIC MIGRAINE WITHOUT AURA WITHOUT STATUS MIGRAINOSUS, NOT INTRACTABLE: Primary | ICD-10-CM

## 2023-09-02 DIAGNOSIS — M54.9 INTRACTABLE BACK PAIN: ICD-10-CM

## 2023-09-02 LAB
ANION GAP SERPL CALCULATED.3IONS-SCNC: 11 MMOL/L (ref 9–17)
BASOPHILS # BLD: 0.06 K/UL (ref 0–0.2)
BASOPHILS NFR BLD: 1 % (ref 0–2)
BUN SERPL-MCNC: 15 MG/DL (ref 6–20)
CALCIUM SERPL-MCNC: 9 MG/DL (ref 8.6–10.4)
CHLORIDE SERPL-SCNC: 106 MMOL/L (ref 98–107)
CO2 SERPL-SCNC: 22 MMOL/L (ref 20–31)
CREAT SERPL-MCNC: 0.7 MG/DL (ref 0.7–1.2)
EKG ATRIAL RATE: 64 BPM
EKG P AXIS: 35 DEGREES
EKG P-R INTERVAL: 144 MS
EKG Q-T INTERVAL: 418 MS
EKG QRS DURATION: 94 MS
EKG QTC CALCULATION (BAZETT): 431 MS
EKG R AXIS: 72 DEGREES
EKG T AXIS: 51 DEGREES
EKG VENTRICULAR RATE: 64 BPM
EOSINOPHIL # BLD: 0.23 K/UL (ref 0–0.44)
EOSINOPHILS RELATIVE PERCENT: 3 % (ref 1–4)
ERYTHROCYTE [DISTWIDTH] IN BLOOD BY AUTOMATED COUNT: 15.9 % (ref 11.8–14.4)
GFR SERPL CREATININE-BSD FRML MDRD: >60 ML/MIN/1.73M2
GLUCOSE SERPL-MCNC: 90 MG/DL (ref 70–99)
HCT VFR BLD AUTO: 38.5 % (ref 40.7–50.3)
HGB BLD-MCNC: 12.5 G/DL (ref 13–17)
IMM GRANULOCYTES # BLD AUTO: 0.03 K/UL (ref 0–0.3)
IMM GRANULOCYTES NFR BLD: 0 %
LYMPHOCYTES NFR BLD: 1.75 K/UL (ref 1.1–3.7)
LYMPHOCYTES RELATIVE PERCENT: 21 % (ref 24–43)
MCH RBC QN AUTO: 27.8 PG (ref 25.2–33.5)
MCHC RBC AUTO-ENTMCNC: 32.5 G/DL (ref 28.4–34.8)
MCV RBC AUTO: 85.6 FL (ref 82.6–102.9)
MONOCYTES NFR BLD: 0.57 K/UL (ref 0.1–1.2)
MONOCYTES NFR BLD: 7 % (ref 3–12)
NEUTROPHILS NFR BLD: 68 % (ref 36–65)
NEUTS SEG NFR BLD: 5.81 K/UL (ref 1.5–8.1)
NRBC BLD-RTO: 0 PER 100 WBC
PLATELET # BLD AUTO: 294 K/UL (ref 138–453)
PMV BLD AUTO: 9.6 FL (ref 8.1–13.5)
POTASSIUM SERPL-SCNC: 3.8 MMOL/L (ref 3.7–5.3)
RBC # BLD AUTO: 4.5 M/UL (ref 4.21–5.77)
RBC # BLD: ABNORMAL 10*6/UL
SODIUM SERPL-SCNC: 139 MMOL/L (ref 135–144)
TROPONIN I SERPL HS-MCNC: <6 NG/L (ref 0–22)
WBC OTHER # BLD: 8.5 K/UL (ref 3.5–11.3)

## 2023-09-02 PROCEDURE — 96374 THER/PROPH/DIAG INJ IV PUSH: CPT

## 2023-09-02 PROCEDURE — 99284 EMERGENCY DEPT VISIT MOD MDM: CPT

## 2023-09-02 PROCEDURE — 6360000002 HC RX W HCPCS: Performed by: STUDENT IN AN ORGANIZED HEALTH CARE EDUCATION/TRAINING PROGRAM

## 2023-09-02 PROCEDURE — 70450 CT HEAD/BRAIN W/O DYE: CPT

## 2023-09-02 PROCEDURE — 84484 ASSAY OF TROPONIN QUANT: CPT

## 2023-09-02 PROCEDURE — 96361 HYDRATE IV INFUSION ADD-ON: CPT

## 2023-09-02 PROCEDURE — 96375 TX/PRO/DX INJ NEW DRUG ADDON: CPT

## 2023-09-02 PROCEDURE — 93005 ELECTROCARDIOGRAM TRACING: CPT | Performed by: STUDENT IN AN ORGANIZED HEALTH CARE EDUCATION/TRAINING PROGRAM

## 2023-09-02 PROCEDURE — 85025 COMPLETE CBC W/AUTO DIFF WBC: CPT

## 2023-09-02 PROCEDURE — 2580000003 HC RX 258: Performed by: STUDENT IN AN ORGANIZED HEALTH CARE EDUCATION/TRAINING PROGRAM

## 2023-09-02 PROCEDURE — 80048 BASIC METABOLIC PNL TOTAL CA: CPT

## 2023-09-02 RX ORDER — PROCHLORPERAZINE EDISYLATE 5 MG/ML
10 INJECTION INTRAMUSCULAR; INTRAVENOUS ONCE
Status: COMPLETED | OUTPATIENT
Start: 2023-09-02 | End: 2023-09-02

## 2023-09-02 RX ORDER — DIPHENHYDRAMINE HYDROCHLORIDE 50 MG/ML
12.5 INJECTION INTRAMUSCULAR; INTRAVENOUS ONCE
Status: COMPLETED | OUTPATIENT
Start: 2023-09-02 | End: 2023-09-02

## 2023-09-02 RX ORDER — 0.9 % SODIUM CHLORIDE 0.9 %
1000 INTRAVENOUS SOLUTION INTRAVENOUS ONCE
Status: COMPLETED | OUTPATIENT
Start: 2023-09-02 | End: 2023-09-02

## 2023-09-02 RX ADMIN — DIPHENHYDRAMINE HYDROCHLORIDE 12.5 MG: 50 INJECTION INTRAMUSCULAR; INTRAVENOUS at 10:07

## 2023-09-02 RX ADMIN — SODIUM CHLORIDE 1000 ML: 9 INJECTION, SOLUTION INTRAVENOUS at 10:06

## 2023-09-02 RX ADMIN — PROCHLORPERAZINE EDISYLATE 10 MG: 5 INJECTION INTRAMUSCULAR; INTRAVENOUS at 10:08

## 2023-09-02 ASSESSMENT — PAIN DESCRIPTION - DESCRIPTORS: DESCRIPTORS: ACHING

## 2023-09-02 ASSESSMENT — PAIN DESCRIPTION - LOCATION: LOCATION: HEAD

## 2023-09-02 ASSESSMENT — ENCOUNTER SYMPTOMS
BACK PAIN: 1
SHORTNESS OF BREATH: 0

## 2023-09-02 ASSESSMENT — PAIN DESCRIPTION - ONSET: ONSET: ON-GOING

## 2023-09-02 ASSESSMENT — PAIN DESCRIPTION - PAIN TYPE: TYPE: ACUTE PAIN

## 2023-09-02 ASSESSMENT — PAIN DESCRIPTION - FREQUENCY: FREQUENCY: CONTINUOUS

## 2023-09-02 ASSESSMENT — PAIN SCALES - GENERAL: PAINLEVEL_OUTOF10: 10

## 2023-09-02 ASSESSMENT — PAIN DESCRIPTION - ORIENTATION: ORIENTATION: RIGHT;LEFT

## 2023-09-02 NOTE — DISCHARGE INSTRUCTIONS
Thank you for coming to [facility] [place] emergency department! You were seen today for headache. Follow up with Dr. Danielle Shaffer.

## 2023-09-02 NOTE — ED PROVIDER NOTES
[address] ED  Emergency Department Encounter  Emergency Medicine Resident     Pt Prosper Bernal  MRN: [id number]  [address] [facility] 1975  Date of evaluation: 9/2/23  PCP:  No primary care provider on file. Note Started: 10:43 AM EDT      CHIEF COMPLAINT       Chief Complaint   Patient presents with    Headache     Bad migraine headache since last night       HISTORY OF PRESENT ILLNESS  (Location/Symptom, Timing/Onset, Context/Setting, Quality, Duration, Modifying Factors, Severity.)      Indra Goss is a 46 y.o. male who presents with headache going on for a few days. Patient reports that he has a history of migraine headaches and that when he was living in [place] they were giving him an injection of medications which would make the headaches go away and that he was on pills however he has not been taking them since he has been here since he does not have a PCP however he does have insurance and is looking for 1. He reports that he also has chronic back pain and this is caused him to have numbness in all of his extremities. He does report that sometimes when it is cold out and he is sleeping he dreams that he is in the bathroom and urinates on himself. However he does not regularly have incontinence. Patient reports that he recently had dizziness a week ago and fell onto his back but not his head did not lose consciousness. However his headache has been ongoing since then and currently rates it as a 10 out of 10. PAST MEDICAL / SURGICAL / SOCIAL / FAMILY HISTORY      has a past medical history of Arthritis, Asthma, Depression, and Migraines. has a past surgical history that includes meniscectomy; Finger fracture surgery (Left, 03/31/2022); Finger Closed Reduction (Left, 03/31/2022); and Finger surgery (Left, 3/31/2022).     Social History     Socioeconomic History    Marital status: Single     Spouse name: Not on file    Number of children: Not on file    Years of

## 2023-09-02 NOTE — ED NOTES
Patient registered with the social security number given to nurse. Patient verified identity with 2 identifying factors.   Patient here with complaints of back pain      Marcia Louis RN  09/02/23 6227

## 2023-09-02 NOTE — ED TRIAGE NOTES
[employer] interpretor services. Pt with migraine bad headache since 10pm last night. Pt with chronic back pain. Came today because headache so bad. Pain is \"10\"  Does feel like Migraines he would get in [place].  No recent falls, not recent injury to back. Works in [employer] and has been feeling not himself with some occasional numbness to arms and hands. Having dizzy like feeling at work past few days but no falls. Feels like he's not walking normally because of back pain.   Gait to room steady without difficulty

## 2023-09-05 LAB
EKG ATRIAL RATE: 64 BPM
EKG P AXIS: 35 DEGREES
EKG P-R INTERVAL: 144 MS
EKG Q-T INTERVAL: 418 MS
EKG QRS DURATION: 94 MS
EKG QTC CALCULATION (BAZETT): 431 MS
EKG R AXIS: 72 DEGREES
EKG T AXIS: 51 DEGREES
EKG VENTRICULAR RATE: 64 BPM

## 2023-09-05 PROCEDURE — 93010 ELECTROCARDIOGRAM REPORT: CPT | Performed by: INTERNAL MEDICINE

## 2023-09-11 ENCOUNTER — HOSPITAL ENCOUNTER (EMERGENCY)
Age: 48
Discharge: HOME | End: 2023-09-12
Attending: EMERGENCY MEDICINE
Payer: COMMERCIAL

## 2023-09-11 DIAGNOSIS — S61.211A LACERATION OF LEFT INDEX FINGER WITHOUT FOREIGN BODY WITHOUT DAMAGE TO NAIL, INITIAL ENCOUNTER: Primary | ICD-10-CM

## 2023-09-11 PROCEDURE — 99284 EMERGENCY DEPT VISIT MOD MDM: CPT

## 2023-09-11 PROCEDURE — 12001 RPR S/N/AX/GEN/TRNK 2.5CM/<: CPT

## 2023-09-12 VITALS
OXYGEN SATURATION: 96 % | HEART RATE: 99 BPM | RESPIRATION RATE: 18 BRPM | TEMPERATURE: 98 F | SYSTOLIC BLOOD PRESSURE: 120 MMHG | DIASTOLIC BLOOD PRESSURE: 86 MMHG

## 2023-09-12 PROCEDURE — 6360000002 HC RX W HCPCS

## 2023-09-12 PROCEDURE — 90471 IMMUNIZATION ADMIN: CPT

## 2023-09-12 PROCEDURE — 90715 TDAP VACCINE 7 YRS/> IM: CPT

## 2023-09-12 PROCEDURE — 6370000000 HC RX 637 (ALT 250 FOR IP)

## 2023-09-12 RX ORDER — IBUPROFEN 800 MG/1
800 TABLET ORAL ONCE
Status: COMPLETED | OUTPATIENT
Start: 2023-09-12 | End: 2023-09-12

## 2023-09-12 RX ORDER — ACETAMINOPHEN 500 MG
500 TABLET ORAL 4 TIMES DAILY PRN
Qty: 60 TABLET | Refills: 0 | Status: SHIPPED | OUTPATIENT
Start: 2023-09-12

## 2023-09-12 RX ADMIN — TETANUS TOXOID, REDUCED DIPHTHERIA TOXOID AND ACELLULAR PERTUSSIS VACCINE, ADSORBED 0.5 ML: 5; 2.5; 8; 8; 2.5 SUSPENSION INTRAMUSCULAR at 00:33

## 2023-09-12 RX ADMIN — Medication 3 ML: at 00:33

## 2023-09-12 RX ADMIN — Medication 3 ML: at 01:15

## 2023-09-12 RX ADMIN — IBUPROFEN 800 MG: 800 TABLET, FILM COATED ORAL at 00:34

## 2023-09-12 ASSESSMENT — PAIN SCALES - GENERAL: PAINLEVEL_OUTOF10: 6

## 2023-09-12 ASSESSMENT — PAIN - FUNCTIONAL ASSESSMENT: PAIN_FUNCTIONAL_ASSESSMENT: 0-10

## 2023-09-12 NOTE — ED TRIAGE NOTES
Patient presents to ED room 11 via triage with complaints of finger laceration from work. Patient states he cut his finger on a piece of glass. Bleeding controlled. Patient unsure of when last tetanus shot was. Patient A&O4, respirations even and unlabored, and speaking in complete sentences.

## 2023-09-12 NOTE — ED PROVIDER NOTES
[facility] ED  Emergency Department Encounter  Emergency Medicine Resident     Pt Justice Mittal  MRN: [id number]  [address] [facility] [place] 1975  Date of evaluation: 9/12/23  PCP:  No primary care provider on file. Note Started: 12:28 AM EDT      CHIEF COMPLAINT       Chief Complaint   Patient presents with    Laceration     HISTORY OF PRESENT ILLNESS  (Location/Symptom, Timing/Onset, Context/Setting, Quality, Duration, Modifying Factors, Severity.)      Irena Essex is a left-hand-dominant 46 y.o. male with past medical history of arthritis who presents with a laceration to the tip of his left index finger that occurred at 11 PM tonight. Patient explains that he works as a  and was wearing gloves but accidentally picked up a broken glass that cut through his gloves. He denies feeling as though there is any glass inside the wound. Bleeding has been controlled. Patient is unsure when his last tetanus vaccination was. Denies any significant pain, decreased range of motion, decreased sensation, weakness, numbness. Patient came in today for further evaluation of the wound as well as for Worker's Comp. paperwork. Patient is Tuvaluan-speaking, does not understand any [place].  was used for all parts of history taking, physical exam, plan, treatment, and beyond. PAST MEDICAL / SURGICAL / SOCIAL / FAMILY HISTORY      has a past medical history of Arthritis, Asthma, Depression, and Migraines. has a past surgical history that includes meniscectomy; Finger fracture surgery (Left, 03/31/2022); Finger Closed Reduction (Left, 03/31/2022); and Finger surgery (Left, 3/31/2022).       Social History     Socioeconomic History    Marital status: Single     Spouse name: Not on file    Number of children: Not on file    Years of education: Not on file    Highest education level: Not on file   Occupational History    Not on file   Tobacco Use    Smoking status: Every Day

## 2023-09-21 ENCOUNTER — HOSPITAL ENCOUNTER (EMERGENCY)
Age: 48
Discharge: HOME | End: 2023-09-21
Attending: EMERGENCY MEDICINE
Payer: COMMERCIAL

## 2023-09-21 VITALS
BODY MASS INDEX: 26.96 KG/M2 | DIASTOLIC BLOOD PRESSURE: 71 MMHG | OXYGEN SATURATION: 99 % | TEMPERATURE: 98.2 F | HEIGHT: 74 IN | HEART RATE: 84 BPM | RESPIRATION RATE: 16 BRPM | SYSTOLIC BLOOD PRESSURE: 126 MMHG

## 2023-09-21 DIAGNOSIS — Z48.02 VISIT FOR SUTURE REMOVAL: Primary | ICD-10-CM

## 2023-09-21 PROCEDURE — 99282 EMERGENCY DEPT VISIT SF MDM: CPT

## 2023-09-21 ASSESSMENT — PAIN - FUNCTIONAL ASSESSMENT
PAIN_FUNCTIONAL_ASSESSMENT: NONE - DENIES PAIN

## 2023-09-21 ASSESSMENT — ENCOUNTER SYMPTOMS
DIARRHEA: 0
COUGH: 0
EYE PAIN: 0
NAUSEA: 0
VOMITING: 0
ABDOMINAL PAIN: 0
RHINORRHEA: 0
BACK PAIN: 0
SHORTNESS OF BREATH: 0
EYE REDNESS: 0
SORE THROAT: 0

## 2023-09-22 NOTE — ED NOTES
Patient presents to the ED with reports of a suture removal to his left index finger. Patient was told to come back 7-10 days for the removal. Denies pain, alert and oriented.      Marilee Suarez RN  09/21/23 9670

## 2023-09-22 NOTE — DISCHARGE INSTRUCTIONS
Return  Please return to the ED if symptoms worsen. Any discharge from wound, increasing pain or change in color. You have been medically cleared to return to work.

## 2024-02-25 ENCOUNTER — APPOINTMENT (OUTPATIENT)
Dept: GENERAL RADIOLOGY | Age: 49
End: 2024-02-25
Payer: COMMERCIAL

## 2024-02-25 ENCOUNTER — HOSPITAL ENCOUNTER (EMERGENCY)
Age: 49
Discharge: HOME | End: 2024-02-25
Attending: EMERGENCY MEDICINE
Payer: COMMERCIAL

## 2024-02-25 VITALS
SYSTOLIC BLOOD PRESSURE: 107 MMHG | HEART RATE: 97 BPM | OXYGEN SATURATION: 97 % | TEMPERATURE: 97.2 F | RESPIRATION RATE: 18 BRPM | DIASTOLIC BLOOD PRESSURE: 71 MMHG

## 2024-02-25 DIAGNOSIS — G89.29 CHRONIC PAIN OF LEFT KNEE: Primary | ICD-10-CM

## 2024-02-25 DIAGNOSIS — M25.562 CHRONIC PAIN OF LEFT KNEE: Primary | ICD-10-CM

## 2024-02-25 PROCEDURE — 73562 X-RAY EXAM OF KNEE 3: CPT

## 2024-02-25 PROCEDURE — 99283 EMERGENCY DEPT VISIT LOW MDM: CPT

## 2024-02-25 RX ORDER — NAPROXEN 500 MG/1
500 TABLET ORAL 2 TIMES DAILY WITH MEALS
Qty: 6 TABLET | Refills: 0 | Status: SHIPPED | OUTPATIENT
Start: 2024-02-25 | End: 2024-02-28

## 2024-02-25 NOTE — ED PROVIDER NOTES
[facility]     Emergency Department     Faculty Attestation    I performed a history and physical examination of the patient and discussed management with the resident. I have reviewed and agree with the resident’s findings including all diagnostic interpretations, and treatment plans as written. Any areas of disagreement are noted on the chart. I was personally present for the key portions of any procedures. I have documented in the chart those procedures where I was not present during the key portions. I have reviewed the emergency nurses triage note. I agree with the chief complaint, past medical history, past surgical history, allergies, medications, social and family history as documented unless otherwise noted below. Documentation of the HPI, Physical Exam and Medical Decision Making performed by sergio is based on my personal performance of the HPI, PE and MDM. For Physician Assistant/ Nurse Practitioner cases/documentation I have personally evaluated this patient and have completed at least one if not all key elements of the E/M (history, physical exam, and MDM). Additional findings are as noted.    Note Started: 2:55 AM EST     47 yo M c/o L knee pain,   PE vss bilateral lower extremities nv intact, no deformity, atraumatic appearance, skin intact  D pedal pulse =,     Xr L knee -, treating arthralgia, referred to PCP  No indication of acute process,    EKG Interpretation    Interpreted by me      CRITICAL CARE: There was a high probability of clinically significant/life threatening deterioration in this patient's condition which required my urgent intervention.  Total critical care time was 0 minutes.  This excludes any time for separately reportable procedures.       Romeo Humphrey DO  02/25/24 0300       Romeo Devi DO  02/25/24 0438

## 2024-02-25 NOTE — ED NOTES
Patient to ED complaining of bilateral knee pain. No signs of deformity, trauma. Denies fall. A&OX4, VSS, NAD, Call light in reach. Plan of care on going.

## 2024-02-25 NOTE — ED PROVIDER NOTES
[facility] ED  Emergency Department Encounter  Emergency Medicine Resident     Pt Name:Shayne Almazan  MRN: [id number]  Birthdate 1975  Date of evaluation: 2/25/24  PCP:  No primary care provider on file.  Note Started: 3:26 AM EST      CHIEF COMPLAINT       Chief Complaint   Patient presents with    Knee Pain     Bilateral knee pain       HISTORY OF PRESENT ILLNESS  (Location/Symptom, Timing/Onset, Context/Setting, Quality, Duration, Modifying Factors, Severity.)      Shayne Almazan is a 48 y.o. male who presents with left knee pain.  Patient states he is not having any pain in his right knee.  States that he has had pain in this knee for very long time, however has been getting progressively worse.  States the pain is a dull pain.,  Mild in severity.  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  Has not been using anything for the pain.  Denies any fever, chills, chest pain, shortness of breath, difficulty ambulating.    PAST MEDICAL / SURGICAL / SOCIAL / FAMILY HISTORY      has a past medical history of Arthritis, Asthma, Depression, and Migraines.       has a past surgical history that includes meniscectomy; Finger fracture surgery (Left, 03/31/2022); Finger Closed Reduction (Left, 03/31/2022); and Finger surgery (Left, 3/31/2022).      Social History     Socioeconomic History    Marital status: Single     Spouse name: Not on file    Number of children: Not on file    Years of education: Not on file    Highest education level: Not on file   Occupational History    Not on file   Tobacco Use    Smoking status: Every Day     Current packs/day: 0.00     Types: E-Cigarettes, Cigarettes    Smokeless tobacco: Never   Substance and Sexual Activity    Alcohol use: Never    Drug use: Yes     Types: Marijuana (Weed)     Comment: occasional    Sexual activity: Not on file   Other Topics Concern    Not on file   Social History Narrative    Not on file     Social

## 2024-02-25 NOTE — DISCHARGE INSTRUCTIONS
[place] sherman medicación según lo indicado y prescrito. Para el dolor, use acetaminofén (Tylenol) o ibuprofeno (Motrin / Advil), a menos que se receten medicamentos que contengan acetaminofén o ibuprofeno (o medicamentos similares). Puede spike tabletas de paracetamol de venta isatu (1 a 2 tabletas de 500 mg cada 6 horas) o tabletas de ibuprofeno (2 tabletas cada 4 horas).    POR FAVOR REGRESE AL DEPARTAMENTO DE EMERGENCIA INMEDIATAMENTE si el dolor empeora, incapacidad para  el músculo, empeoramiento del dolor, hormigueo o pérdida de sensación, incapacidad para  la rafia o los dedos, o si desarrolla algún síntoma preocupante jose c: fiebre deric que no se soco. por paracetamol (Tylenol) y/o ibuprofeno (Motrin/Advil), escalofríos, sensación de que sherman corazón palpita o acelera, náuseas y/o vómitos persistentes, vómitos con varun, varun en las heces, pérdida del conocimiento, entumecimiento, debilidad u hormigueo. en los brazos o piernas o cambio en el color de las extremidades, cambios en el estado mental, dolor de cm persistente, visión borrosa, pérdida del control de la vejiga o los intestinos, incapacidad de realizar un seguimiento con sherman médico u otro tipo de atención o inquietud.

## 2024-03-01 ENCOUNTER — TELEPHONE (OUTPATIENT)
Dept: FAMILY MEDICINE CLINIC | Age: 49
End: 2024-03-01

## 2024-03-01 NOTE — TELEPHONE ENCOUNTER
LVM for a return call to reschedule appointment on 03/07/2024, due to provider not in clinic that day.

## 2024-03-21 ENCOUNTER — OFFICE VISIT (OUTPATIENT)
Dept: FAMILY MEDICINE CLINIC | Age: 49
End: 2024-03-21

## 2024-03-21 VITALS
BODY MASS INDEX: 26.56 KG/M2 | HEART RATE: 93 BPM | SYSTOLIC BLOOD PRESSURE: 139 MMHG | OXYGEN SATURATION: 96 % | DIASTOLIC BLOOD PRESSURE: 87 MMHG | WEIGHT: 207 LBS | HEIGHT: 74 IN

## 2024-03-21 DIAGNOSIS — Z13.1 SCREENING FOR DIABETES MELLITUS: ICD-10-CM

## 2024-03-21 DIAGNOSIS — M25.561 PAIN IN BOTH KNEES, UNSPECIFIED CHRONICITY: Primary | ICD-10-CM

## 2024-03-21 DIAGNOSIS — M54.50 LOW BACK PAIN WITHOUT SCIATICA, UNSPECIFIED BACK PAIN LATERALITY, UNSPECIFIED CHRONICITY: ICD-10-CM

## 2024-03-21 DIAGNOSIS — M25.562 PAIN IN BOTH KNEES, UNSPECIFIED CHRONICITY: Primary | ICD-10-CM

## 2024-03-21 DIAGNOSIS — Z13.9 SCREENING DUE: ICD-10-CM

## 2024-03-21 DIAGNOSIS — Z13.220 SCREENING FOR HYPERLIPIDEMIA: ICD-10-CM

## 2024-03-21 RX ORDER — CYCLOBENZAPRINE HCL 10 MG
10 TABLET ORAL 2 TIMES DAILY PRN
Qty: 10 TABLET | Refills: 0 | Status: SHIPPED | OUTPATIENT
Start: 2024-03-21 | End: 2024-04-10

## 2024-03-21 SDOH — ECONOMIC STABILITY: FOOD INSECURITY: WITHIN THE PAST 12 MONTHS, YOU WORRIED THAT YOUR FOOD WOULD RUN OUT BEFORE YOU GOT MONEY TO BUY MORE.: SOMETIMES TRUE

## 2024-03-21 SDOH — ECONOMIC STABILITY: FOOD INSECURITY: WITHIN THE PAST 12 MONTHS, THE FOOD YOU BOUGHT JUST DIDN'T LAST AND YOU DIDN'T HAVE MONEY TO GET MORE.: SOMETIMES TRUE

## 2024-03-21 SDOH — ECONOMIC STABILITY: HOUSING INSECURITY
IN THE LAST 12 MONTHS, WAS THERE A TIME WHEN YOU DID NOT HAVE A STEADY PLACE TO SLEEP OR SLEPT IN A SHELTER (INCLUDING NOW)?: NO

## 2024-03-21 SDOH — ECONOMIC STABILITY: INCOME INSECURITY: HOW HARD IS IT FOR YOU TO PAY FOR THE VERY BASICS LIKE FOOD, HOUSING, MEDICAL CARE, AND HEATING?: NOT HARD AT ALL

## 2024-03-21 ASSESSMENT — ENCOUNTER SYMPTOMS
CONSTIPATION: 0
DIARRHEA: 0
ABDOMINAL PAIN: 0

## 2024-03-21 ASSESSMENT — PATIENT HEALTH QUESTIONNAIRE - PHQ9
SUM OF ALL RESPONSES TO PHQ QUESTIONS 1-9: 0
SUM OF ALL RESPONSES TO PHQ9 QUESTIONS 1 & 2: 0
1. LITTLE INTEREST OR PLEASURE IN DOING THINGS: NOT AT ALL
SUM OF ALL RESPONSES TO PHQ QUESTIONS 1-9: 0
2. FEELING DOWN, DEPRESSED OR HOPELESS: NOT AT ALL

## 2024-03-21 NOTE — PROGRESS NOTES
Visit Information    Have you changed or started any medications since your last visit including any over-the-counter medicines, vitamins, or herbal medicines? no   Have you stopped taking any of your medications? Is so, why? -  no  Are you having any side effects from any of your medications? - no    Have you seen any other physician or provider since your last visit?  no   Have you had any other diagnostic tests since your last visit?  no   Have you been seen in the emergency room and/or had an admission in a hospital since we last saw you?  no   Have you had your routine dental cleaning in the past 6 months?  no     Do you have an active MyChart account? If no, what is the barrier?  Yes    No care team member to display    Medical History Review  Past Medical, Family, and Social History reviewed and does not contribute to the patient presenting condition    Health Maintenance   Topic Date Due    Hepatitis B vaccine (1 of 3 - 3-dose series) Never done    Pneumococcal 0-64 years Vaccine (1 of 2 - PCV) Never done    Depression Screen  Never done    HIV screen  Never done    Hepatitis C screen  Never done    Lipids  Never done    Colorectal Cancer Screen  Never done    Shingles vaccine (2 of 2) 04/13/2024    DTaP/Tdap/Td vaccine (3 - Td or Tdap) 09/12/2033    Flu vaccine  Completed    COVID-19 Vaccine  Completed    Hepatitis A vaccine  Aged Out    Hib vaccine  Aged Out    Polio vaccine  Aged Out    Meningococcal (ACWY) vaccine  Aged Out

## 2024-03-21 NOTE — PROGRESS NOTES
[facility]idency Program - Outpatient Note      Subjective:    Shayne Estes is a 48 y.o. male with  has a past medical history of Arthritis, Asthma, Depression, and Migraines.    Presented to the office today for:  Chief Complaint   Patient presents with    New Patient     Patient here to discuss arthritis and patient went to  [facility]  48-year-old male from [place] moved here in 2020, came to establish care, needs .  Patient complains that he has knee pain in both his legs, the pain gets worse on standing and doing some activity, he does not have morning sickness.  Patient also complains of back pain that has been going on for years mostly in lower back, the pain does not radiate down to his legs.  Patient was told that he has rheumatoid arthritis, on thorough chart review I could not find any supporting evidence, reassured patient that there are no such test in his chart right now that points of to for his rheumatoid arthritis, ask him to bring all his medications and records from [place] back.    Also discussed with him that we will do the blood work to check his cholesterol levels and diabetes, and will do x-rays of both his knees and back to make to get proper diagnosis of arthritis    We will order hep C and HIV screening for him too.      Review of Systems   Cardiovascular:  Negative for chest pain, palpitations and leg swelling.   Gastrointestinal:  Negative for abdominal pain, constipation and diarrhea.   Neurological:  Negative for headaches.       The patient has a No family history on file.    Objective:    /87 (Site: Left Upper Arm, Position: Sitting, Cuff Size: Medium Adult)   Pulse 93   Ht 1.88 m (6' 2\")   Wt 93.9 kg (207 lb)   SpO2 96%   BMI 26.58 kg/m²    BP Readings from Last 3 Encounters:   03/21/24 139/87   02/25/24 107/71   09/21/23 126/71       Physical Exam  HENT:      Head: Normocephalic and

## 2024-03-21 NOTE — PROGRESS NOTES
Attending Physician Statement  I  have discussed the care of Shayne Estes including pertinent history and exam findings with the resident. I agree with the assessment, plan and orders as documented by the resident.      /87 (Site: Left Upper Arm, Position: Sitting, Cuff Size: Medium Adult)   Pulse 93   Ht 1.88 m (6' 2\")   Wt 93.9 kg (207 lb)   SpO2 96%   BMI 26.58 kg/m²    BP Readings from Last 3 Encounters:   03/21/24 139/87   02/25/24 107/71   09/21/23 126/71     Wt Readings from Last 3 Encounters:   03/21/24 93.9 kg (207 lb)   09/02/23 95.3 kg (210 lb)   11/27/22 99.3 kg (219 lb)          Diagnosis Orders   1. Pain in both knees, unspecified chronicity  diclofenac sodium (VOLTAREN) 1 % GEL    cyclobenzaprine (FLEXERIL) 10 MG tablet    XR KNEE RIGHT (3 VIEWS)    XR KNEE LEFT (3 VIEWS)      2. Low back pain without sciatica, unspecified back pain laterality, unspecified chronicity  XR LUMBAR SPINE (2-3 VIEWS)      3. Screening for diabetes mellitus  Hemoglobin A1C      4. Screening for hyperlipidemia  Lipid Panel      5. Screening due  Hepatitis C Antibody    HIV Screen          Here to establish care    Hx of asthma- albuterol inhaler, patient will bring in medications at next visit.     Recently moved from [place] 2020.     C/o Knee pain- acute on chronic. Xray b/l knee.     Xray Lumbar spine for low back pain.           Tasneem Drake DO 3/22/2024 2:41 PM

## 2024-03-21 NOTE — PATIENT INSTRUCTIONS
Thank you for letting us take care of you today. We hope all your questions were addressed. If a question was overlooked or something else comes to mind after you return home, please contact a member of your Care Team listed below.      Your Care Team at [facility] is Team #  Prabhakar Carrillo, (Faculty)  Migue Avilez (Resident)  Migue Aguiar (Resident)   Uzair Lindo (Resident)  Billy Smiley (Resident)  Lorrie Busch., A  Latoya Butler., A  Nena Saucedo, A  Yesenia Albarran, [facility]  Aaron Parks, [facility]  Kierra Escobar, [facility]  Lorraine Ojeda, [facility]  Merlene Sneed, [facility]  Ambrocio (LJ) TAWNY Gomez (Clinical Practice Manager)  Doreen Maldonado [facility] (Clinical Pharmacist)     Office phone number: [phone]    If you need to get in right away due to illness, please be advised we have \"Same Day\" appointments available Monday-Friday. Please call us at [phone] option #3 to schedule your \"Same Day\" appointment.   Recursos alimentarios del [facility]*    [employer]  Lo que ofrecen: programa de comidas después de la escuela que proporciona comidas a los niños.   Número de teléfono: [phone]  [facility] [employer] [place]   Lo que ofrecen: de lunes a selam, desayuno de 7:00 a. m. a 8:00 a. m.; almuerzo de 12:00 p. m. a 1:00 p. m. y srini de 5:00 p. m. a 6:00 p. m.  Número de teléfono: [phone]; [address] [address]  [employer]:  Lo que ofrecen: despensa de alimentos, abierto a todos.   Número de [phone].   [facility] [place] (condado de [place]):  Lo que ofrecen: programa de comidas calientes, abierto a todos.  Número de teléfono: [phone].  [employer] (cond[place]):  Lo que ofrecen: despensa de alimentos, abierto a todos.  Número de [phone]  [facility] (condado de [place]):   Lo que ofrecen: Juve de Aliment[employer], abierto a todos.  Número de [phone])

## 2024-03-22 ENCOUNTER — HOSPITAL ENCOUNTER (OUTPATIENT)
Dept: GENERAL RADIOLOGY | Age: 49
End: 2024-03-22
Payer: COMMERCIAL

## 2024-03-22 ENCOUNTER — HOSPITAL ENCOUNTER (OUTPATIENT)
Age: 49
Discharge: HOME | End: 2024-03-22
Payer: COMMERCIAL

## 2024-03-22 ENCOUNTER — HOSPITAL ENCOUNTER (OUTPATIENT)
Age: 49
End: 2024-03-22
Payer: COMMERCIAL

## 2024-03-22 DIAGNOSIS — M25.561 PAIN IN BOTH KNEES, UNSPECIFIED CHRONICITY: ICD-10-CM

## 2024-03-22 DIAGNOSIS — Z13.220 SCREENING FOR HYPERLIPIDEMIA: ICD-10-CM

## 2024-03-22 DIAGNOSIS — Z13.9 SCREENING DUE: ICD-10-CM

## 2024-03-22 DIAGNOSIS — M54.50 LOW BACK PAIN WITHOUT SCIATICA, UNSPECIFIED BACK PAIN LATERALITY, UNSPECIFIED CHRONICITY: ICD-10-CM

## 2024-03-22 DIAGNOSIS — M25.562 PAIN IN BOTH KNEES, UNSPECIFIED CHRONICITY: ICD-10-CM

## 2024-03-22 DIAGNOSIS — Z13.1 SCREENING FOR DIABETES MELLITUS: ICD-10-CM

## 2024-03-22 LAB
CHOLEST SERPL-MCNC: 191 MG/DL (ref 0–199)
CHOLESTEROL/HDL RATIO: 4
EST. AVERAGE GLUCOSE BLD GHB EST-MCNC: 111 MG/DL
HBA1C MFR BLD: 5.5 % (ref 4–6)
HCV AB SERPL QL IA: NONREACTIVE
HDLC SERPL-MCNC: 44 MG/DL
HIV 1+2 AB+HIV1 P24 AG SERPL QL IA: NONREACTIVE
LDLC SERPL CALC-MCNC: 73 MG/DL (ref 0–100)
TRIGL SERPL-MCNC: 370 MG/DL
VLDLC SERPL CALC-MCNC: 74 MG/DL

## 2024-03-22 PROCEDURE — 87389 HIV-1 AG W/HIV-1&-2 AB AG IA: CPT

## 2024-03-22 PROCEDURE — 72100 X-RAY EXAM L-S SPINE 2/3 VWS: CPT

## 2024-03-22 PROCEDURE — 83036 HEMOGLOBIN GLYCOSYLATED A1C: CPT

## 2024-03-22 PROCEDURE — 73562 X-RAY EXAM OF KNEE 3: CPT

## 2024-03-22 PROCEDURE — 86803 HEPATITIS C AB TEST: CPT

## 2024-03-22 PROCEDURE — 80061 LIPID PANEL: CPT

## 2024-03-22 PROCEDURE — 36415 COLL VENOUS BLD VENIPUNCTURE: CPT

## (undated) DEVICE — GLOVE ORANGE PI 7   MSG9070

## (undated) DEVICE — Z DISCONTINUED BY MEDLINE USE 2711682 TRAY SKIN PREP DRY W/ PREM GLV

## (undated) DEVICE — SINGLE USE DEVICE INTENDED TO COVER EXPOSED ENDS OF ORTHOPEDIC PIN AND K-WIRES TO HELP PROTECT THE EXPOSED WIRE FROM SNAGGING ON CLOTHING.: Brand: OXBORO™ PIN COVER

## (undated) DEVICE — BANDAGE GZ W2XL75IN ST RAYON POLY CNFRM STRTCH LTWT

## (undated) DEVICE — C-ARM: Brand: UNBRANDED

## (undated) DEVICE — GLOVE ORANGE PI 8   MSG9080

## (undated) DEVICE — GLOVE ORANGE PI 8 1/2   MSG9085

## (undated) DEVICE — SVMMC HND

## (undated) DEVICE — SOLUTION SCRB 4OZ 4% CHG H2O AIDED FOR PREOPERATIVE SKIN

## (undated) DEVICE — GLOVE ORANGE PI 7 1/2   MSG9075